# Patient Record
Sex: FEMALE | Race: WHITE | NOT HISPANIC OR LATINO | Employment: FULL TIME | URBAN - METROPOLITAN AREA
[De-identification: names, ages, dates, MRNs, and addresses within clinical notes are randomized per-mention and may not be internally consistent; named-entity substitution may affect disease eponyms.]

---

## 2017-04-15 ENCOUNTER — HOSPITAL ENCOUNTER (EMERGENCY)
Facility: MEDICAL CENTER | Age: 42
End: 2017-04-15
Attending: EMERGENCY MEDICINE

## 2017-04-15 ENCOUNTER — APPOINTMENT (OUTPATIENT)
Dept: RADIOLOGY | Facility: MEDICAL CENTER | Age: 42
End: 2017-04-15
Attending: EMERGENCY MEDICINE

## 2017-04-15 VITALS
DIASTOLIC BLOOD PRESSURE: 68 MMHG | TEMPERATURE: 97.9 F | HEART RATE: 86 BPM | RESPIRATION RATE: 18 BRPM | HEIGHT: 67 IN | WEIGHT: 215 LBS | BODY MASS INDEX: 33.74 KG/M2 | SYSTOLIC BLOOD PRESSURE: 110 MMHG | OXYGEN SATURATION: 96 %

## 2017-04-15 DIAGNOSIS — S93.402A SPRAIN OF LEFT ANKLE, UNSPECIFIED LIGAMENT, INITIAL ENCOUNTER: ICD-10-CM

## 2017-04-15 PROCEDURE — 700102 HCHG RX REV CODE 250 W/ 637 OVERRIDE(OP): Performed by: EMERGENCY MEDICINE

## 2017-04-15 PROCEDURE — 99284 EMERGENCY DEPT VISIT MOD MDM: CPT

## 2017-04-15 PROCEDURE — A9270 NON-COVERED ITEM OR SERVICE: HCPCS | Performed by: EMERGENCY MEDICINE

## 2017-04-15 PROCEDURE — 73620 X-RAY EXAM OF FOOT: CPT | Mod: LT

## 2017-04-15 PROCEDURE — 73610 X-RAY EXAM OF ANKLE: CPT | Mod: LT

## 2017-04-15 RX ORDER — IBUPROFEN 600 MG/1
600 TABLET ORAL EVERY 6 HOURS PRN
COMMUNITY
End: 2018-03-22 | Stop reason: SDUPTHER

## 2017-04-15 RX ORDER — OXYCODONE AND ACETAMINOPHEN 10; 325 MG/1; MG/1
1 TABLET ORAL ONCE
Status: COMPLETED | OUTPATIENT
Start: 2017-04-15 | End: 2017-04-15

## 2017-04-15 RX ORDER — HYDROCODONE BITARTRATE AND ACETAMINOPHEN 5; 325 MG/1; MG/1
1-2 TABLET ORAL EVERY 4 HOURS PRN
Qty: 15 TAB | Refills: 0 | Status: SHIPPED | OUTPATIENT
Start: 2017-04-15 | End: 2018-02-07

## 2017-04-15 RX ADMIN — OXYCODONE HYDROCHLORIDE AND ACETAMINOPHEN 1 TABLET: 10; 325 TABLET ORAL at 17:54

## 2017-04-15 ASSESSMENT — PAIN SCALES - GENERAL: PAINLEVEL_OUTOF10: 1

## 2017-04-15 NOTE — ED AVS SNAPSHOT
4/15/2017    Gerald Stein  No address on file.    Dear Gerald:    [facility] wants to ensure your discharge home is safe and you or your loved ones have had all of your questions answered regarding your care after you leave the hospital.    Below is a list of resources and contact information should you have any questions regarding your hospital stay, follow-up instructions, or active medical symptoms.    Questions or Concerns Regarding… Contact   Medical Questions Related to Your Discharge  (7 days a week, 8am-5pm) Contact a Nurse Care Coordinator   [phone]   Medical Questions Not Related to Your Discharge  (24 hours a day / 7 days a week)  Contact the Nurse Health Line   [phone]    Medications or Discharge Instructions Refer to your discharge packet   or contact your [facility] Primary Care Provider   [phone]   Follow-up Appointment(s) Schedule your appointment via Degreed   or contact Scheduling [phone]   Billing Review your statement via Degreed  or contact Billing [phone]   Medical Records Review your records via Degreed   or contact Medical Records [phone]     You may receive a telephone call within two days of discharge. This call is to make certain you understand your discharge instructions and have the opportunity to have any questions answered. You can also easily access your medical information, test results and upcoming appointments via the Degreed free online health management tool. You can learn more and sign up at 8Trip/Degreed. For assistance setting up your Degreed account, please call [phone].    Once again, we want to ensure your discharge home is safe and that you have a clear understanding of any next steps in your care. If you have any questions or concerns, please do not hesitate to contact us, we are here for you. Thank you for choosing [facility] for your healthcare needs.    Sincerely,    Your [facility] Healthcare Team

## 2017-04-15 NOTE — ED AVS SNAPSHOT
Home Care Instructions                                                                                                                Gerald Stien   MRN: [id number]    Department:  [facility], Emergency Dept   Date of Visit:  4/15/2017            [facility], Emergency Dept    [address]    [place] [address]    Phone:  [phone]      You were seen by     Goldy Walters M.D.      Your Diagnosis Was     Sprain of left ankle, unspecified ligament, initial encounter     S93.402A       These are the medications you received during your hospitalization from 04/15/2017 1550 to 04/15/2017 1909     Date/Time Order Dose Route Action    04/15/2017 1754 oxycodone-acetaminophen (PERCOCET-10)  MG per tablet 1 Tab 1 Tab Oral Given      Medication Information     Review all of your home medications and newly ordered medications with your primary doctor and/or pharmacist as soon as possible. Follow medication instructions as directed by your doctor and/or pharmacist.     Please keep your complete medication list with you and share with your physician. Update the information when medications are discontinued, doses are changed, or new medications (including over-the-counter products) are added; and carry medication information at all times in the event of emergency situations.               Medication List      START taking these medications        Instructions    Morning Afternoon Evening Bedtime    hydrocodone-acetaminophen 5-325 MG Tabs per tablet   Commonly known as:  NORCO        Take 1-2 Tabs by mouth every four hours as needed.   Dose:  1-2 Tab                          ASK your doctor about these medications        Instructions    Morning Afternoon Evening Bedtime    ibuprofen 600 MG Tabs   Commonly known as:  MOTRIN        Take 600 mg by mouth every 6 hours as needed.   Dose:  600 mg                             Where to Get Your Medications      You can get these  medications from any pharmacy     Bring a paper prescription for each of these medications    - hydrocodone-acetaminophen 5-325 MG Tabs per tablet            Procedures and tests performed during your visit     DX-ANKLE 3+ VIEWS LEFT    DX-FOOT-2- LEFT        Discharge Instructions       Ankle Sprain  An ankle sprain is an injury to the strong, fibrous tissues (ligaments) that hold the bones of your ankle joint together.   CAUSES  An ankle sprain is usually caused by a fall or by twisting your ankle. Ankle sprains most commonly occur when you step on the outer edge of your foot, and your ankle turns inward. People who participate in sports are more prone to these types of injuries.   SYMPTOMS   · Pain in your ankle. The pain may be present at rest or only when you are trying to stand or walk.  · Swelling.  · Bruising. Bruising may develop immediately or within 1 to 2 days after your injury.  · Difficulty standing or walking, particularly when turning corners or changing directions.  DIAGNOSIS   Your caregiver will ask you details about your injury and perform a physical exam of your ankle to determine if you have an ankle sprain. During the physical exam, your caregiver will press on and apply pressure to specific areas of your foot and ankle. Your caregiver will try to move your ankle in certain ways. An X-ray exam may be done to be sure a bone was not broken or a ligament did not separate from one of the bones in your ankle (avulsion fracture).   TREATMENT   Certain types of braces can help stabilize your ankle. Your caregiver can make a recommendation for this. Your caregiver may recommend the use of medicine for pain. If your sprain is severe, your caregiver may refer you to a surgeon who helps to restore function to parts of your skeletal system (orthopedist) or a physical therapist.  HOME CARE INSTRUCTIONS   · Apply ice to your injury for 1-2 days or as directed by your caregiver. Applying ice helps to reduce  inflammation and pain.  ¨ Put ice in a plastic bag.  ¨ Place a towel between your skin and the bag.  ¨ Leave the ice on for 15-20 minutes at a time, every 2 hours while you are awake.  · Only take over-the-counter or prescription medicines for pain, discomfort, or fever as directed by your caregiver.  · Elevate your injured ankle above the level of your heart as much as possible for 2-3 days.  · If your caregiver recommends crutches, use them as instructed. Gradually put weight on the affected ankle. Continue to use crutches or a cane until you can walk without feeling pain in your ankle.  · If you have a plaster splint, wear the splint as directed by your caregiver. Do not rest it on anything harder than a pillow for the first 24 hours. Do not put weight on it. Do not get it wet. You may take it off to take a shower or bath.  · You may have been given an elastic bandage to wear around your ankle to provide support. If the elastic bandage is too tight (you have numbness or tingling in your foot or your foot becomes cold and blue), adjust the bandage to make it comfortable.  · If you have an air splint, you may blow more air into it or let air out to make it more comfortable. You may take your splint off at night and before taking a shower or bath. Wiggle your toes in the splint several times per day to decrease swelling.  SEEK MEDICAL CARE IF:   · You have rapidly increasing bruising or swelling.  · Your toes feel extremely cold or you lose feeling in your foot.  · Your pain is not relieved with medicine.  SEEK IMMEDIATE MEDICAL CARE IF:  · Your toes are numb or blue.  · You have severe pain that is increasing.  MAKE SURE YOU:   · Understand these instructions.  · Will watch your condition.  · Will get help right away if you are not doing well or get worse.     This information is not intended to replace advice given to you by your health care provider. Make sure you discuss any questions you have with your health  care provider.     Document Released: 12/18/2006 Document Revised: 01/08/2016 Document Reviewed: 12/29/2012  Elsevier Interactive Patient Education ©2016 Elsevier Inc.            Patient Information     Patient Information    Following emergency treatment: all patient requiring follow-up care must return either to a private physician or a clinic if your condition worsens before you are able to obtain further medical attention, please return to the emergency room.     Billing Information    At [facility], we work to make the billing process streamlined for our patients.  Our Representatives are here to answer any questions you may have regarding your hospital bill.  If you have insurance coverage and have supplied your insurance information to us, we will submit a claim to your insurer on your behalf.  Should you have any questions regarding your bill, we can be reached online or by phone as follows:  Online: You are able pay your bills online or live chat with our representatives about any billing questions you may have. We are here to help Monday - Friday from 8:00am to 7:30pm and 9:00am - 12:00pm on Saturdays.  Please visit [email or web address]  for more information.   Phone:  [phone] or [phone]    Please note that your emergency physician, surgeon, pathologist, radiologist, anesthesiologist, and other specialists are not employed by [facility] and will therefore bill separately for their services.  Please contact them directly for any questions concerning their bills at the numbers below:     Emergency Physician Services:  [phone]  [facility]:  [phone]  [facility]:  [phone]  [facility]:  [phone]    1. Your final bill may vary from the amount quoted upon discharge if all procedures are not complete at that time, or if your doctor has additional procedures of which we are not aware. You will receive  an additional bill if you return to the Emergency Department at [facility] for suture removal regardless of the facility of which the sutures were placed.     2. Please arrange for settlement of this account at the emergency registration.    3. All self-pay accounts are due in full at the time of treatment.  If you are unable to meet this obligation then payment is expected within 4-5 days.     4. If you have had radiology studies (CT, X-ray, Ultrasound, MRI), you have received a preliminary result during your emergency department visit. Please contact the radiology department [phone] to receive a copy of your final result. Please discuss the Final result with your primary physician or with the follow up physician provided.     Crisis Hotline:  [place] Crisis Hotline:  6-700-IXMPYAZ or 1-380.408.3947  [place] Crisis Hotline:    1-162.442.1560 or 378-776-2081         ED Discharge Follow Up Questions    1. In order to provide you with very good care, we would like to follow up with a phone call in the next few days.  May we have your permission to contact you?     YES /  NO    2. What is the best phone number to call you? (       )_____-__________    3. What is the best time to call you?      Morning  /  Afternoon  /  Evening                   Patient Signature:  ____________________________________________________________    Date:  ____________________________________________________________

## 2017-04-15 NOTE — ED NOTES
Pt to triage via w/c c/o left sided top of foot pain after missing a step down the stairs. No obvious deformities however pt will not let me remove her shoe due to too much pain.

## 2017-04-15 NOTE — ED AVS SNAPSHOT
[employer] Access Code: [email or web address]  Expires: 5/15/2017  7:09 PM    Your email address is not on file at [facility].  Email Addresses are required for you to sign up for [employer], please contact [phone] to verify your personal information and to provide your email address prior to attempting to register for [employer].    Gerald Stein  No address on file    [employer]  A secure, online tool to manage your health information     [facility]’s [employer]® is a secure, online tool that connects you to your personalized health information from the privacy of your home -- day or night - making it very easy for you to manage your healthcare. Once the activation process is completed, you can even access your medical information using the [employer] lesvia, which is available for free in the Apple Lesvia store or Google Play store.     To learn more about [employer], visit [email or web address]    There are two levels of access available (as shown below):   My Chart Features  [facility] Primary Care Doctor [facility]  Specialists [facility]  Urgent  Care Non-[facility] Primary Care Doctor   Email your healthcare team securely and privately 24/7 X X X    Manage appointments: schedule your next appointment; view details of past/upcoming appointments X      Request prescription refills. X      View recent personal medical records, including lab and immunizations X X X X   View health record, including health history, allergies, medications X X X X   Read reports about your outpatient visits, procedures, consult and ER notes X X X X   See your discharge summary, which is a recap of your hospital and/or ER visit that includes your diagnosis, lab results, and care plan X X  X     How to register for [employer]:  Once your e-mail address has been verified, follow the following steps to sign up for [employer].     1. Go to  [email or web address]  2. Click on the Sign Up Now box, which takes you to the New Member Sign Up page. You will need to provide  the following information:  a. Enter your Duriana Access Code exactly as it appears at the top of this page. (You will not need to use this code after you’ve completed the sign-up process. If you do not sign up before the expiration date, you must request a new code.)   b. Enter your date of birth.   c. Enter your home email address.   d. Click Submit, and follow the next screen’s instructions.  3. Create a Duriana ID. This will be your Duriana login ID and cannot be changed, so think of one that is secure and easy to remember.  4. Create a Duriana password. You can change your password at any time.  5. Enter your Password Reset Question and Answer. This can be used at a later time if you forget your password.   6. Enter your e-mail address. This allows you to receive e-mail notifications when new information is available in Duriana.  7. Click Sign Up. You can now view your health information.    For assistance activating your [employer] account, call [phone]

## 2017-04-15 NOTE — LETTER
[facility], EMERGENCY DEPT  [address] [place] [address]  [phone]     April 15, 2017    Patient: Gerald Stein   YOB: 1975   Date of Visit: 4/15/2017       To Whom It May Concern:    Gerald Stein was seen and treated in our department on 4/15/2017.     May excuse from work on 4/15/2017 until 4/20/2017      Sincerely,       Emergency Department  Anisha Sibley R.N.

## 2017-04-16 NOTE — ED PROVIDER NOTES
"ED Provider Note    Scribed for Goldy Walters M.D. by Aden Canseco. 4/15/2017  5:48 PM    Primary care provider: No primary care provider on file.  Means of arrival: Walk in  History obtained from: Patient  History limited by: None    CHIEF COMPLAINT  Chief Complaint   Patient presents with   • Foot Pain       HPI  Gerald Stein is a 41 y.o. female who presents to the Emergency Department complaining of left foot pain onset 1.5 hours ago. Patient states she was carrying laundry down the stairs when she tripped and injured her foot. She notes the pain radiates to her ankle. She also notes associated mild pain up her leg. Patient states pain is exacerbated when putting weight on the leg. She does not report any fevers.    REVIEW OF SYSTEMS  Pertinent positives include left foot, leg, and ankle pain. Pertinent negatives include no fevers.    See HPI for further details.   E    PAST MEDICAL HISTORY   None noted    SURGICAL HISTORY  patient denies any surgical history    SOCIAL HISTORY  None noted    FAMILY HISTORY  None noted    CURRENT MEDICATIONS  Home Medications     Reviewed by Anisha Sibley R.N. (Registered Nurse) on 04/15/17 at 1223  Med List Status: Complete    Medication Last Dose Status    ibuprofen (MOTRIN) 600 MG Tab prn Active                ALLERGIES  Allergies   Allergen Reactions   • Flagyl [Kdc:Metronidazole+Tartrazine] Rash     Rash         PHYSICAL EXAM  VITAL SIGNS: /78 mmHg  Pulse 105  Temp(Src) 36.6 °C (97.9 °F) (Temporal)  Resp 19  Ht 1.702 m (5' 7\")  Wt 97.523 kg (215 lb)  BMI 33.67 kg/m2  SpO2 98%    Constitutional: Well developed, Well nourished,no acute distress, Non-toxic appearance.   HENT: Normocephalic, Atraumatic.  Oropharynx moist.   Eyes: PERRL, EOMI, Conjunctiva normal, No discharge.   Neck: no anterior cervical lymphadenopathy  CV: Good pulses  Thorax & Lungs: No respiratory distress.   Abdomen:  Soft, non-tender.  No rebound, no peritoneal signs.   Skin: " Warm, Dry, No erythema, No rash.    Musculoskeletal: No major deformities noted. Swelling and tenderness medially and laterally to left ankle and left foot. No proximal tenderness.  Neurologic: Awake, alert. Moves all extremities spontaneously.  Psychiatric: Affect normal, Mood normal.     RADIOLOGY  DX-FOOT-2- LEFT   Final Result      No evidence of fracture.      DX-ANKLE 3+ VIEWS LEFT   Final Result      No evidence of fracture.        The radiologist's interpretation of all radiological studies have been reviewed by me.    COURSE & MEDICAL DECISION MAKING  Nursing notes, VS, PMSFHx reviewed in chart.    5:48 PM - Patient seen and examined at bedside. Patient will be treated with Percocet-10  mg. Ordered DX ankle, DX foot to evaluate her symptoms. We'll put    7:01 PM Patient reevaluated at bedside. Discussed radiology results as seen above which show no evidence of fracture. The patient will be discharged and should return if symptoms worsen or if new symptoms arise. The patient understands and agrees to plan. We'll put on crutches for the next few days    I reviewed prescription monitoring program for patient's narcotic use before prescribing a scheduled drug.The patient will not drink alcohol nor drive with prescribed medications. The patient will return for new or worsening symptoms and is stable at the time of discharge.    The patient is referred to a primary physician for blood pressure management, diabetic screening, and for all other preventative health concerns.    DISPOSITION:  Patient will be discharged home in stable condition.    FOLLOW UP:dr Wong      OUTPATIENT MEDICATIONS:  Discharge Medication List as of 4/15/2017  7:09 PM      START taking these medications    Details   hydrocodone-acetaminophen (NORCO) 5-325 MG Tab per tablet Take 1-2 Tabs by mouth every four hours as needed., Disp-15 Tab, R-0, Print Rx Paper               FINAL IMPRESSION  1. Sprain of left ankle, unspecified  ligament, initial encounter          I, Aden Canseco (Scribe), am scribing for, and in the presence of, Goldy Walters M.D..    Electronically signed by: Aden Canseco (Scribe), 4/15/2017    IGoldy M.D. personally performed the services described in this documentation, as scribed by Aden Canseco in my presence, and it is both accurate and complete.    The note accurately reflects work and decisions made by me.  Goldy Walters  4/15/2017  7:58 PM

## 2017-04-16 NOTE — ED NOTES
poc explained to pt. Medicated per mar order. Allergies verified.  Pt denies pregnancy. Waiting for xray.

## 2017-04-16 NOTE — ED NOTES
Discharge instructions given to pt including follow up w/pcp or returning for any worsening symptoms.  Per erp pt may have several days off. Work note provided to pt. Pt verbalized relief of pain.  Prescription for pain given to pt and instructed no driving no drinking alcohol while on prescribed pain medication.  Crutches including provided to pt by ED tech.

## 2017-04-16 NOTE — DISCHARGE INSTRUCTIONS
Ankle Sprain  An ankle sprain is an injury to the strong, fibrous tissues (ligaments) that hold the bones of your ankle joint together.   CAUSES  An ankle sprain is usually caused by a fall or by twisting your ankle. Ankle sprains most commonly occur when you step on the outer edge of your foot, and your ankle turns inward. People who participate in sports are more prone to these types of injuries.   SYMPTOMS   · Pain in your ankle. The pain may be present at rest or only when you are trying to stand or walk.  · Swelling.  · Bruising. Bruising may develop immediately or within 1 to 2 days after your injury.  · Difficulty standing or walking, particularly when turning corners or changing directions.  DIAGNOSIS   Your caregiver will ask you details about your injury and perform a physical exam of your ankle to determine if you have an ankle sprain. During the physical exam, your caregiver will press on and apply pressure to specific areas of your foot and ankle. Your caregiver will try to move your ankle in certain ways. An X-ray exam may be done to be sure a bone was not broken or a ligament did not separate from one of the bones in your ankle (avulsion fracture).   TREATMENT   Certain types of braces can help stabilize your ankle. Your caregiver can make a recommendation for this. Your caregiver may recommend the use of medicine for pain. If your sprain is severe, your caregiver may refer you to a surgeon who helps to restore function to parts of your skeletal system (orthopedist) or a physical therapist.  HOME CARE INSTRUCTIONS   · Apply ice to your injury for 1-2 days or as directed by your caregiver. Applying ice helps to reduce inflammation and pain.  ¨ Put ice in a plastic bag.  ¨ Place a towel between your skin and the bag.  ¨ Leave the ice on for 15-20 minutes at a time, every 2 hours while you are awake.  · Only take over-the-counter or prescription medicines for pain, discomfort, or fever as directed by  your caregiver.  · Elevate your injured ankle above the level of your heart as much as possible for 2-3 days.  · If your caregiver recommends crutches, use them as instructed. Gradually put weight on the affected ankle. Continue to use crutches or a cane until you can walk without feeling pain in your ankle.  · If you have a plaster splint, wear the splint as directed by your caregiver. Do not rest it on anything harder than a pillow for the first 24 hours. Do not put weight on it. Do not get it wet. You may take it off to take a shower or bath.  · You may have been given an elastic bandage to wear around your ankle to provide support. If the elastic bandage is too tight (you have numbness or tingling in your foot or your foot becomes cold and blue), adjust the bandage to make it comfortable.  · If you have an air splint, you may blow more air into it or let air out to make it more comfortable. You may take your splint off at night and before taking a shower or bath. Wiggle your toes in the splint several times per day to decrease swelling.  SEEK MEDICAL CARE IF:   · You have rapidly increasing bruising or swelling.  · Your toes feel extremely cold or you lose feeling in your foot.  · Your pain is not relieved with medicine.  SEEK IMMEDIATE MEDICAL CARE IF:  · Your toes are numb or blue.  · You have severe pain that is increasing.  MAKE SURE YOU:   · Understand these instructions.  · Will watch your condition.  · Will get help right away if you are not doing well or get worse.     This information is not intended to replace advice given to you by your health care provider. Make sure you discuss any questions you have with your health care provider.     Document Released: 12/18/2006 Document Revised: 01/08/2016 Document Reviewed: 12/29/2012  [employer] Interactive Patient Education ©2016 [employer]

## 2018-02-07 ENCOUNTER — OFFICE VISIT (OUTPATIENT)
Dept: MEDICAL GROUP | Facility: MEDICAL CENTER | Age: 43
End: 2018-02-07
Attending: INTERNAL MEDICINE
Payer: COMMERCIAL

## 2018-02-07 VITALS
BODY MASS INDEX: 34.06 KG/M2 | WEIGHT: 217 LBS | OXYGEN SATURATION: 99 % | SYSTOLIC BLOOD PRESSURE: 124 MMHG | DIASTOLIC BLOOD PRESSURE: 84 MMHG | RESPIRATION RATE: 16 BRPM | HEIGHT: 67 IN | TEMPERATURE: 97.5 F | HEART RATE: 88 BPM

## 2018-02-07 DIAGNOSIS — E66.9 OBESITY (BMI 30-39.9): ICD-10-CM

## 2018-02-07 DIAGNOSIS — Z13.220 SCREENING, LIPID: ICD-10-CM

## 2018-02-07 DIAGNOSIS — R53.83 FATIGUE, UNSPECIFIED TYPE: ICD-10-CM

## 2018-02-07 DIAGNOSIS — R51.9 CHRONIC NONINTRACTABLE HEADACHE, UNSPECIFIED HEADACHE TYPE: ICD-10-CM

## 2018-02-07 DIAGNOSIS — Z13.1 SCREENING FOR DIABETES MELLITUS: ICD-10-CM

## 2018-02-07 DIAGNOSIS — G89.29 CHRONIC NONINTRACTABLE HEADACHE, UNSPECIFIED HEADACHE TYPE: ICD-10-CM

## 2018-02-07 PROBLEM — R14.0 BLOATING: Status: ACTIVE | Noted: 2018-02-07

## 2018-02-07 PROCEDURE — 99204 OFFICE O/P NEW MOD 45 MIN: CPT | Performed by: INTERNAL MEDICINE

## 2018-02-07 ASSESSMENT — PAIN SCALES - GENERAL: PAINLEVEL: 3=SLIGHT PAIN

## 2018-02-07 ASSESSMENT — PATIENT HEALTH QUESTIONNAIRE - PHQ9: CLINICAL INTERPRETATION OF PHQ2 SCORE: 0

## 2018-02-08 NOTE — PROGRESS NOTES
Gerald Stein is a 42 y.o. female here for worsening headaches and tiredness, establish care  HPI:  No previous PCP  Headache  Patient reports a long history of headaches, however since moving to [place] 3 years ago, they have gotten worse. She feels over the past several months, they have become nearly unbearable. She states that every week she will have about 3 days where she has a headache. It is usually over the right temporal area and the left parietal region. She has not found any alleviating factors although she has tried Tylenol and ibuprofen. Sometimes the headaches are present in the mornings and worse at that time. She denies snoring, orthopnea, PND. She denies associated nausea and vomiting but does complain of photophobia with the headaches. She also feels like her vision gets blurry when she has a headache but otherwise sees normally. She complains of associated neck pain that has been slightly worse lately but not problematic enough to prompt her to take anything for it. She also complains of significant increase in her stress level. She has a history of grinding her teeth and requiring a  in the past, which she has not been wearing recently. She does feel like she is grinding more severely at night. She has a positive family history for brain cancer in her father when he was around 65 years old.    Fatigue  Patient reports worsening fatigue. She states that she does have some difficulty sleeping at night and uses melatonin to help with this. She denies snoring, orthopnea, PND. Has been about 3 years since she's had any blood work.    Current medicines (including changes today)  Current Outpatient Prescriptions   Medication Sig Dispense Refill   • ibuprofen (MOTRIN) 600 MG Tab Take 600 mg by mouth every 6 hours as needed.       No current facility-administered medications for this visit.      She  has a past medical history of Head ache and Obesity.  She  has a past surgical history that  "includes tubal coagulation laparoscopic bilateral and tube & ectopic preg., removal.  Social History   Substance Use Topics   • Smoking status: Former Smoker     Packs/day: 1.00     Years: 10.00     Types: Cigarettes     Quit date: 2/7/2000   • Smokeless tobacco: Never Used   • Alcohol use 3.6 oz/week     6 Cans of beer per week     Social History     Social History Narrative   • No narrative on file     Family History   Problem Relation Age of Onset   • Heart Disease Father    • Cancer Father 65     brain   • Cancer Sister      eye lid, skin   • Diabetes Neg Hx    • Stroke Neg Hx          ROS  As above in HPI  All other systems reviewed and are negative  Complains of bloating that is chronic     Objective:     Blood pressure 124/84, pulse 88, temperature 36.4 °C (97.5 °F), resp. rate 16, height 1.702 m (5' 7.01\"), weight 98.4 kg (217 lb), SpO2 99 %. Body mass index is 33.98 kg/m².  Physical Exam:    Constitutional: Alert, no distress.  Skin: Warm, dry, good turgor, no rashes in visible areas.  Eye: Equal, round and reactive, conjunctiva clear, lids normal.  ENMT: Lips without lesions, fair dentition, oropharynx clear, TM's clear bilaterally but scarred, no tenderness to palpation over frontal or maxillary sinuses bilaterally, turbinates slightly enlarged bilaterally.  Neck: Trachea midline, no masses, no thyromegaly. No cervical or supraclavicular lymphadenopathy.  Respiratory: Unlabored respiratory effort, lungs clear to auscultation, no wheezes, no ronchi.  Cardiovascular: Normal S1, S2, no murmur, no edema.  Abdomen: Soft, non-tender, no masses, no hepatosplenomegaly.  Psych: Alert and oriented x3, normal affect and mood.  Neuro: CN II-XII grossly intact      Assessment and Plan:   The following treatment plan was discussed    1. Chronic nonintractable headache, unspecified headache type  Unclear etiology at this point. Does not sound classic for migraines as the headaches are bilateral, with no nausea, and " they're not debilitating. She may have tension headaches with the increased neck discomfort she's been describing. Differential would also include space-occupying lesion with her family history and the worsening headaches over the past 3 months, so we will obtain a CT head to rule this out. She feels that her vision is not normal especially when she has a headache and she is going to have vision testing through an optometrist to rule out this component. Chronic sinusitis is a possibility although less likely based on my exam. TMJ related headache could also be going on. Stress and anxiety may be worsening her headaches as well. She may also have sleep apnea although she doesn't have typical symptoms for this.  - CT-HEAD W/O; Future  -Recommended patient start wearing a   -Follow-up with optometry for vision testing  -Follow-up for imaging review in 4 weeks    2. Fatigue, unspecified type  We will obtain some basic blood work as initial workup. Patient will follow-up for review in 4 weeks  - TSH WITH REFLEX TO FT4; Future  - CBC WITH DIFFERENTIAL; Future  - COMP METABOLIC PANEL; Future    3. Need for vaccination  - Tdap =>6yo IM    4. Screening, lipid  - LIPID PROFILE; Future    5. Screening for diabetes mellitus  - HEMOGLOBIN A1C; Future    6. Obesity (BMI 30-39.9)  - Patient identified as having weight management issue.  Appropriate orders and counseling given.    HCM  With regards to her health maintenance, patient is due for a Pap smear and a mammogram. We will discuss these at her next visit.    Followup: Return in about 4 weeks (around 3/7/2018) for labs, imaging, headaches, bloating.

## 2018-02-08 NOTE — ASSESSMENT & PLAN NOTE
Patient reports worsening fatigue. She states that she does have some difficulty sleeping at night and uses melatonin to help with this. She denies snoring, orthopnea, PND. Has been about 3 years since she's had any blood work.

## 2018-02-08 NOTE — ASSESSMENT & PLAN NOTE
Patient reports a long history of headaches, however since moving to [place] 3 years ago, they have gotten worse. She feels over the past several months, they have become nearly unbearable. She states that every week she will have about 3 days where she has a headache. It is usually over the right temporal area and the left parietal region. She has not found any alleviating factors although she has tried Tylenol and ibuprofen. Sometimes the headaches are present in the mornings and worse at that time. She denies snoring, orthopnea, PND. She denies associated nausea and vomiting but does complain of photophobia with the headaches. She also feels like her vision gets blurry when she has a headache but otherwise sees normally. She complains of associated neck pain that has been slightly worse lately but not problematic enough to prompt her to take anything for it. She also complains of significant increase in her stress level. She has a history of grinding her teeth and requiring a  in the past, which she has not been wearing recently. She does feel like she is grinding more severely at night. She has a positive family history for brain cancer in her father when he was around 65 years old.

## 2018-02-15 ENCOUNTER — HOSPITAL ENCOUNTER (OUTPATIENT)
Dept: LAB | Facility: MEDICAL CENTER | Age: 43
End: 2018-02-15
Attending: INTERNAL MEDICINE
Payer: COMMERCIAL

## 2018-02-15 DIAGNOSIS — Z13.220 SCREENING, LIPID: ICD-10-CM

## 2018-02-15 DIAGNOSIS — R53.83 FATIGUE, UNSPECIFIED TYPE: ICD-10-CM

## 2018-02-15 DIAGNOSIS — Z13.1 SCREENING FOR DIABETES MELLITUS: ICD-10-CM

## 2018-02-15 LAB
ALBUMIN SERPL BCP-MCNC: 4 G/DL (ref 3.2–4.9)
ALBUMIN/GLOB SERPL: 1.3 G/DL
ALP SERPL-CCNC: 57 U/L (ref 30–99)
ALT SERPL-CCNC: 18 U/L (ref 2–50)
ANION GAP SERPL CALC-SCNC: 5 MMOL/L (ref 0–11.9)
AST SERPL-CCNC: 20 U/L (ref 12–45)
BASOPHILS # BLD AUTO: 1.3 % (ref 0–1.8)
BASOPHILS # BLD: 0.11 K/UL (ref 0–0.12)
BILIRUB SERPL-MCNC: 1.7 MG/DL (ref 0.1–1.5)
BUN SERPL-MCNC: 13 MG/DL (ref 8–22)
CALCIUM SERPL-MCNC: 8.9 MG/DL (ref 8.5–10.5)
CHLORIDE SERPL-SCNC: 105 MMOL/L (ref 96–112)
CHOLEST SERPL-MCNC: 156 MG/DL (ref 100–199)
CO2 SERPL-SCNC: 25 MMOL/L (ref 20–33)
CREAT SERPL-MCNC: 0.93 MG/DL (ref 0.5–1.4)
EOSINOPHIL # BLD AUTO: 0.29 K/UL (ref 0–0.51)
EOSINOPHIL NFR BLD: 3.5 % (ref 0–6.9)
ERYTHROCYTE [DISTWIDTH] IN BLOOD BY AUTOMATED COUNT: 42.1 FL (ref 35.9–50)
EST. AVERAGE GLUCOSE BLD GHB EST-MCNC: 97 MG/DL
GLOBULIN SER CALC-MCNC: 3.1 G/DL (ref 1.9–3.5)
GLUCOSE SERPL-MCNC: 86 MG/DL (ref 65–99)
HBA1C MFR BLD: 5 % (ref 0–5.6)
HCT VFR BLD AUTO: 43.8 % (ref 37–47)
HDLC SERPL-MCNC: 31 MG/DL
HGB BLD-MCNC: 14.6 G/DL (ref 12–16)
IMM GRANULOCYTES # BLD AUTO: 0.03 K/UL (ref 0–0.11)
IMM GRANULOCYTES NFR BLD AUTO: 0.4 % (ref 0–0.9)
LDLC SERPL CALC-MCNC: 82 MG/DL
LYMPHOCYTES # BLD AUTO: 2.36 K/UL (ref 1–4.8)
LYMPHOCYTES NFR BLD: 28.1 % (ref 22–41)
MCH RBC QN AUTO: 31.1 PG (ref 27–33)
MCHC RBC AUTO-ENTMCNC: 33.3 G/DL (ref 33.6–35)
MCV RBC AUTO: 93.4 FL (ref 81.4–97.8)
MONOCYTES # BLD AUTO: 0.6 K/UL (ref 0–0.85)
MONOCYTES NFR BLD AUTO: 7.2 % (ref 0–13.4)
NEUTROPHILS # BLD AUTO: 5 K/UL (ref 2–7.15)
NEUTROPHILS NFR BLD: 59.5 % (ref 44–72)
NRBC # BLD AUTO: 0 K/UL
NRBC BLD-RTO: 0 /100 WBC
PLATELET # BLD AUTO: 190 K/UL (ref 164–446)
PMV BLD AUTO: 11 FL (ref 9–12.9)
POTASSIUM SERPL-SCNC: 3.7 MMOL/L (ref 3.6–5.5)
PROT SERPL-MCNC: 7.1 G/DL (ref 6–8.2)
RBC # BLD AUTO: 4.69 M/UL (ref 4.2–5.4)
SODIUM SERPL-SCNC: 135 MMOL/L (ref 135–145)
TRIGL SERPL-MCNC: 215 MG/DL (ref 0–149)
TSH SERPL DL<=0.005 MIU/L-ACNC: 1.04 UIU/ML (ref 0.38–5.33)
WBC # BLD AUTO: 8.4 K/UL (ref 4.8–10.8)

## 2018-02-15 PROCEDURE — 36415 COLL VENOUS BLD VENIPUNCTURE: CPT

## 2018-02-15 PROCEDURE — 84443 ASSAY THYROID STIM HORMONE: CPT

## 2018-02-15 PROCEDURE — 83036 HEMOGLOBIN GLYCOSYLATED A1C: CPT

## 2018-02-15 PROCEDURE — 80053 COMPREHEN METABOLIC PANEL: CPT

## 2018-02-15 PROCEDURE — 85025 COMPLETE CBC W/AUTO DIFF WBC: CPT

## 2018-02-15 PROCEDURE — 80061 LIPID PANEL: CPT

## 2018-02-20 ENCOUNTER — HOSPITAL ENCOUNTER (OUTPATIENT)
Facility: MEDICAL CENTER | Age: 43
End: 2018-02-20
Attending: INTERNAL MEDICINE
Payer: COMMERCIAL

## 2018-02-20 ENCOUNTER — OFFICE VISIT (OUTPATIENT)
Dept: MEDICAL GROUP | Facility: MEDICAL CENTER | Age: 43
End: 2018-02-20
Attending: INTERNAL MEDICINE
Payer: COMMERCIAL

## 2018-02-20 VITALS
RESPIRATION RATE: 16 BRPM | OXYGEN SATURATION: 100 % | TEMPERATURE: 98.1 F | SYSTOLIC BLOOD PRESSURE: 110 MMHG | DIASTOLIC BLOOD PRESSURE: 78 MMHG | HEART RATE: 96 BPM | WEIGHT: 216 LBS | BODY MASS INDEX: 33.9 KG/M2 | HEIGHT: 67 IN

## 2018-02-20 DIAGNOSIS — Z12.4 SCREENING FOR MALIGNANT NEOPLASM OF CERVIX: ICD-10-CM

## 2018-02-20 DIAGNOSIS — N89.8 VAGINAL ITCHING: ICD-10-CM

## 2018-02-20 PROCEDURE — 87660 TRICHOMONAS VAGIN DIR PROBE: CPT

## 2018-02-20 PROCEDURE — 87480 CANDIDA DNA DIR PROBE: CPT

## 2018-02-20 PROCEDURE — 87624 HPV HI-RISK TYP POOLED RSLT: CPT

## 2018-02-20 PROCEDURE — 88175 CYTOPATH C/V AUTO FLUID REDO: CPT

## 2018-02-20 PROCEDURE — 99213 OFFICE O/P EST LOW 20 MIN: CPT | Performed by: INTERNAL MEDICINE

## 2018-02-20 PROCEDURE — 87510 GARDNER VAG DNA DIR PROBE: CPT

## 2018-02-20 PROCEDURE — 87591 N.GONORRHOEAE DNA AMP PROB: CPT

## 2018-02-20 PROCEDURE — 99214 OFFICE O/P EST MOD 30 MIN: CPT | Mod: 25 | Performed by: INTERNAL MEDICINE

## 2018-02-20 PROCEDURE — 87491 CHLMYD TRACH DNA AMP PROBE: CPT

## 2018-02-20 ASSESSMENT — PAIN SCALES - GENERAL: PAINLEVEL: 5=MODERATE PAIN

## 2018-02-21 DIAGNOSIS — N76.0 BACTERIAL VAGINOSIS: ICD-10-CM

## 2018-02-21 DIAGNOSIS — B96.89 BACTERIAL VAGINOSIS: ICD-10-CM

## 2018-02-21 LAB
CANDIDA DNA VAG QL PROBE+SIG AMP: NEGATIVE
G VAGINALIS DNA VAG QL PROBE+SIG AMP: POSITIVE
T VAGINALIS DNA VAG QL PROBE+SIG AMP: NEGATIVE

## 2018-02-21 RX ORDER — CLINDAMYCIN HYDROCHLORIDE 300 MG/1
300 CAPSULE ORAL 2 TIMES DAILY
Qty: 14 CAP | Refills: 0 | Status: SHIPPED | OUTPATIENT
Start: 2018-02-21 | End: 2018-02-28

## 2018-02-21 NOTE — ASSESSMENT & PLAN NOTE
Patient reports that for about 2 weeks she has had increased vaginal irritation and itching. She states that she always has a significant amount of vaginal discharge but it hasn't been more than normal. She has been applying anti-itch cream. She states she usually uses over-the-counter metronidazole vaginal preparation to treat this when it has happened in the past but she generally has to do at least 6 days worth. She feels like she has been having more frequent episodes of this lately. She is sexually active with one male partner.

## 2018-02-21 NOTE — PROGRESS NOTES
"Subjective:   Gerald Stein is a 42 y.o. female here today for vaginal itching, PAP    Vaginal itching  Patient reports that for about 2 weeks she has had increased vaginal irritation and itching. She states that she always has a significant amount of vaginal discharge but it hasn't been more than normal. She has been applying anti-itch cream. She states she usually uses over-the-counter metronidazole vaginal preparation to treat this when it has happened in the past but she generally has to do at least 6 days worth. She feels like she has been having more frequent episodes of this lately. She is sexually active with one male partner.    Screening for malignant neoplasm of cervix  Patient reports her last Pap smear was over 3 years ago. She always has consistent vaginal discharge which is discussed above. She denies abnormal vaginal bleeding. She denies dyspareunia or dysuria.       Current medicines (including changes today)  Current Outpatient Prescriptions   Medication Sig Dispense Refill   • ibuprofen (MOTRIN) 600 MG Tab Take 600 mg by mouth every 6 hours as needed.       No current facility-administered medications for this visit.      She  has a past medical history of Head ache and Obesity.    ROS   As above in HPI     Objective:     Blood pressure 110/78, pulse 96, temperature 36.7 °C (98.1 °F), resp. rate 16, height 1.702 m (5' 7.01\"), weight 98 kg (216 lb), SpO2 100 %, not currently breastfeeding. Body mass index is 33.82 kg/m².   Physical Exam:  Constitutional: Alert, no distress.  Skin: Warm, dry, good turgor, no rashes in visible areas.  Eye: Equal, round and reactive, conjunctiva clear, lids normal.  : labia majora somewhat erythematous, cervix with moderate amount of white discharge and somewhat friable with small bleeding after PAP collected, no cervical motion tenderness      Assessment and Plan:   The following treatment plan was discussed    1. Vaginal itching  - VAGINAL PATHOGENS DNA PANEL; " Future    2. Screening for malignant neoplasm of cervix  - THINPREP PAP W/HPV AND CTNG; Future        Followup: Return if symptoms worsen or fail to improve.  Further tx pending results

## 2018-02-21 NOTE — ASSESSMENT & PLAN NOTE
Patient reports her last Pap smear was over 3 years ago. She always has consistent vaginal discharge which is discussed above. She denies abnormal vaginal bleeding. She denies dyspareunia or dysuria.

## 2018-02-22 LAB
C TRACH DNA GENITAL QL NAA+PROBE: NEGATIVE
CYTOLOGY REG CYTOL: NORMAL
HPV HR 12 DNA CVX QL NAA+PROBE: NEGATIVE
HPV16 DNA SPEC QL NAA+PROBE: NEGATIVE
HPV18 DNA SPEC QL NAA+PROBE: NEGATIVE
N GONORRHOEA DNA GENITAL QL NAA+PROBE: NEGATIVE
SPECIMEN SOURCE: NORMAL
SPECIMEN SOURCE: NORMAL

## 2018-02-23 ENCOUNTER — TELEPHONE (OUTPATIENT)
Dept: MEDICAL GROUP | Facility: MEDICAL CENTER | Age: 43
End: 2018-02-23

## 2018-02-23 ENCOUNTER — DOCUMENTATION (OUTPATIENT)
Dept: MEDICAL GROUP | Facility: MEDICAL CENTER | Age: 43
End: 2018-02-23

## 2018-02-23 NOTE — PROGRESS NOTES
Received denial from insurance for CT head. Called for peer to peer.  At this point, the scan has been approved and updated to an MRI which will provide more detail and is overall a better study. Patient will be notified of this change.    Elise Garcia M.D.

## 2018-02-23 NOTE — TELEPHONE ENCOUNTER
----- Message from Elise Garcia M.D. sent at 2/23/2018  7:35 AM PST -----  Please mail normal PAP letter, repeat 3 years.    Elise Garcia M.D.

## 2018-03-08 ENCOUNTER — OFFICE VISIT (OUTPATIENT)
Dept: MEDICAL GROUP | Facility: MEDICAL CENTER | Age: 43
End: 2018-03-08
Attending: INTERNAL MEDICINE
Payer: COMMERCIAL

## 2018-03-08 VITALS
TEMPERATURE: 98 F | SYSTOLIC BLOOD PRESSURE: 118 MMHG | HEIGHT: 67 IN | RESPIRATION RATE: 14 BRPM | WEIGHT: 219 LBS | DIASTOLIC BLOOD PRESSURE: 70 MMHG | OXYGEN SATURATION: 98 % | HEART RATE: 80 BPM | BODY MASS INDEX: 34.37 KG/M2

## 2018-03-08 DIAGNOSIS — R19.7 DIARRHEA, UNSPECIFIED TYPE: ICD-10-CM

## 2018-03-08 DIAGNOSIS — K51.911 ULCERATIVE COLITIS WITH RECTAL BLEEDING, UNSPECIFIED LOCATION (HCC): ICD-10-CM

## 2018-03-08 DIAGNOSIS — R17 ELEVATED BILIRUBIN: ICD-10-CM

## 2018-03-08 PROCEDURE — 99213 OFFICE O/P EST LOW 20 MIN: CPT | Performed by: INTERNAL MEDICINE

## 2018-03-08 PROCEDURE — 99214 OFFICE O/P EST MOD 30 MIN: CPT | Performed by: FAMILY MEDICINE

## 2018-03-08 ASSESSMENT — ENCOUNTER SYMPTOMS
MYALGIAS: 0
DIARRHEA: 1
HEADACHES: 0
ANOREXIA: 0
BLOOD IN STOOL: 1
PALPITATIONS: 0
NAUSEA: 0
BELCHING: 0
COUGH: 0
VOMITING: 0
FEVER: 0
HEMATOCHEZIA: 1
ARTHRALGIAS: 0
CONSTIPATION: 0
ABDOMINAL PAIN: 1
SHORTNESS OF BREATH: 0
SPUTUM PRODUCTION: 0
CHILLS: 0
FLATUS: 0

## 2018-03-08 NOTE — PROGRESS NOTES
Subjective:      Gerald Stein is a 42 y.o. female who presents with No chief complaint on file.            Results for GERALD STEIN (MRN [id number]) as of 3/8/2018 15:29    2/15/2018 08:11  Sodium: 135  Potassium: 3.7  Chloride: 105  Co2: 25  Anion Gap: 5.0  Glucose: 86  Bun: 13  Creatinine: 0.93  GFR If : >60  GFR If Non : >60  Calcium: 8.9  AST(SGOT): 20  ALT(SGPT): 18  Alkaline Phosphatase: 57  Total Bilirubin: 1.7 (H)  Albumin: 4.0  Total Protein: 7.1  Globulin: 3.1  A-G Ratio: 1.3  Glycohemoglobin: 5.0  Estim. Avg Glu: 97        LLQ Pain   This is a recurrent problem. The current episode started more than 1 year ago. The onset quality is undetermined. The problem occurs intermittently. The problem has been gradually worsening. The pain is located in the LLQ, RLQ and periumbilical region. The quality of the pain is colicky, cramping and a sensation of fullness. The abdominal pain does not radiate. Associated symptoms include diarrhea and hematochezia. Pertinent negatives include no anorexia, arthralgias, belching, constipation, dysuria, fever, flatus, frequency, headaches, melena, myalgias, nausea or vomiting. Associated symptoms comments: Bloody stool, fecal incontinence. The pain is aggravated by eating and palpation. The pain is relieved by nothing. She has tried nothing (will order stool studies and refer to GI (history of UC per pt)) for the symptoms.       Review of Systems   Constitutional: Negative for chills and fever.   HENT: Negative for hearing loss and tinnitus.    Respiratory: Negative for cough, sputum production and shortness of breath.    Cardiovascular: Negative for chest pain and palpitations.   Gastrointestinal: Positive for abdominal pain, blood in stool, diarrhea and hematochezia. Negative for anorexia, constipation, flatus, melena, nausea and vomiting.   Genitourinary: Negative for dysuria and frequency.   Musculoskeletal: Negative for arthralgias and  "myalgias.   Neurological: Negative for headaches.          Objective:     Vitals:    03/08/18 1507   BP: 118/70   Pulse: 80   Resp: 14   Temp: 36.7 °C (98 °F)   SpO2: 98%   Weight: 99.3 kg (219 lb)   Height: 1.702 m (5' 7\")          Physical Exam   Constitutional: She is oriented to person, place, and time. She appears well-developed and well-nourished.   HENT:   Head: Normocephalic and atraumatic.   Right Ear: External ear normal.   Left Ear: External ear normal.   Cardiovascular: Normal rate, regular rhythm and normal heart sounds.  Exam reveals no friction rub.    No murmur heard.  Pulmonary/Chest: Effort normal and breath sounds normal. No respiratory distress. She has no wheezes. She has no rales.   Abdominal: Soft. She exhibits distension. She exhibits no mass. There is tenderness. There is no guarding.   Hyperactive bowel sounds    Neurological: She is alert and oriented to person, place, and time.   Skin: Skin is warm and dry.   Nursing note and vitals reviewed.              Assessment/Plan:     1. Ulcerative colitis with rectal bleeding, unspecified location (CMS-HCC)  Will have her referred to GI to establish for a further evaluation and management of her UC. Will continue to follow.  - REFERRAL TO GASTROENTEROLOGY  - CULTURE STOOL; Future  - C Diff by PCR rflx Toxin; Future    2. Diarrhea, unspecified type  Will order a stool cx and C diff for a further evaluation for infectious sources of her her diarrhea.  - REFERRAL TO GASTROENTEROLOGY  - CULTURE STOOL; Future  - C Diff by PCR rflx Toxin; Future    3. Elevated bilirubin  Reviewed the results of her recent blood work, her bilirubin is slightly elevated.   - REFERRAL TO GASTROENTEROLOGY  - CULTURE STOOL; Future  - C Diff by PCR rflx Toxin; Future        "

## 2018-03-10 ENCOUNTER — HOSPITAL ENCOUNTER (OUTPATIENT)
Facility: MEDICAL CENTER | Age: 43
End: 2018-03-10
Attending: FAMILY MEDICINE
Payer: COMMERCIAL

## 2018-03-10 DIAGNOSIS — R19.7 DIARRHEA, UNSPECIFIED TYPE: ICD-10-CM

## 2018-03-10 DIAGNOSIS — K51.911 ULCERATIVE COLITIS WITH RECTAL BLEEDING, UNSPECIFIED LOCATION (HCC): ICD-10-CM

## 2018-03-10 DIAGNOSIS — R17 ELEVATED BILIRUBIN: ICD-10-CM

## 2018-03-10 LAB
C DIFF DNA SPEC QL NAA+PROBE: NEGATIVE
C DIFF TOX GENS STL QL NAA+PROBE: NEGATIVE

## 2018-03-10 PROCEDURE — 87046 STOOL CULTR AEROBIC BACT EA: CPT

## 2018-03-10 PROCEDURE — 87045 FECES CULTURE AEROBIC BACT: CPT

## 2018-03-10 PROCEDURE — 87899 AGENT NOS ASSAY W/OPTIC: CPT

## 2018-03-10 PROCEDURE — 87493 C DIFF AMPLIFIED PROBE: CPT

## 2018-03-11 LAB
E COLI SXT1+2 STL IA: NORMAL
SIGNIFICANT IND 70042: NORMAL
SITE SITE: NORMAL
SOURCE SOURCE: NORMAL

## 2018-03-13 ENCOUNTER — OFFICE VISIT (OUTPATIENT)
Dept: MEDICAL GROUP | Facility: MEDICAL CENTER | Age: 43
End: 2018-03-13
Attending: INTERNAL MEDICINE
Payer: COMMERCIAL

## 2018-03-13 VITALS
OXYGEN SATURATION: 100 % | RESPIRATION RATE: 16 BRPM | BODY MASS INDEX: 33.59 KG/M2 | SYSTOLIC BLOOD PRESSURE: 122 MMHG | HEART RATE: 100 BPM | WEIGHT: 214 LBS | HEIGHT: 67 IN | DIASTOLIC BLOOD PRESSURE: 82 MMHG | TEMPERATURE: 98 F

## 2018-03-13 DIAGNOSIS — G89.29 CHRONIC NONINTRACTABLE HEADACHE, UNSPECIFIED HEADACHE TYPE: ICD-10-CM

## 2018-03-13 DIAGNOSIS — K51.911 ULCERATIVE COLITIS WITH RECTAL BLEEDING, UNSPECIFIED LOCATION (HCC): ICD-10-CM

## 2018-03-13 DIAGNOSIS — R51.9 CHRONIC NONINTRACTABLE HEADACHE, UNSPECIFIED HEADACHE TYPE: ICD-10-CM

## 2018-03-13 PROBLEM — Z12.4 SCREENING FOR MALIGNANT NEOPLASM OF CERVIX: Status: RESOLVED | Noted: 2018-02-20 | Resolved: 2018-03-13

## 2018-03-13 PROBLEM — N89.8 VAGINAL ITCHING: Status: RESOLVED | Noted: 2018-02-20 | Resolved: 2018-03-13

## 2018-03-13 PROBLEM — K51.90 ULCERATIVE COLITIS (HCC): Status: ACTIVE | Noted: 2018-03-13

## 2018-03-13 LAB
BACTERIA STL CULT: NORMAL
E COLI SXT1+2 STL IA: NORMAL
SIGNIFICANT IND 70042: NORMAL
SITE SITE: NORMAL
SOURCE SOURCE: NORMAL

## 2018-03-13 PROCEDURE — 99214 OFFICE O/P EST MOD 30 MIN: CPT | Performed by: INTERNAL MEDICINE

## 2018-03-13 PROCEDURE — 99212 OFFICE O/P EST SF 10 MIN: CPT | Performed by: INTERNAL MEDICINE

## 2018-03-13 RX ORDER — MESALAMINE 800 MG/1
1 TABLET, DELAYED RELEASE ORAL 3 TIMES DAILY
Qty: 90 TAB | Refills: 1 | Status: SHIPPED | OUTPATIENT
Start: 2018-03-13 | End: 2018-03-22 | Stop reason: SDUPTHER

## 2018-03-13 ASSESSMENT — PAIN SCALES - GENERAL: PAINLEVEL: 3=SLIGHT PAIN

## 2018-03-14 NOTE — ASSESSMENT & PLAN NOTE
Patient reports a history of ulcerative colitis which was diagnosed over 5 years ago by colonoscopy. She stated at that time she was having a lot of blood in her bowel movements and was told she had quite a large ulceration. She was offered surgery but she was able to control her symptoms by changing her diet completely. So week or so, she has started to have significant diarrhea and blood in her bowel movements as well as lower abdominal pain. She denies fevers and chills. States that she has had some episodes of fecal incontinence due to urgency. She was seen in the office several days ago and had stool studies done. She was negative for C. difficile as well as Shigella, Salmonella, Enterobacter. She is not currently have a GI doctor and she was not on any treatment for the ulcerative colitis as she was in remission.

## 2018-03-14 NOTE — PROGRESS NOTES
"Subjective:   Gerald Stein is a 42 y.o. female here today for persistent bloody diarrhea    Ulcerative colitis (CMS-[facility])  Patient reports a history of ulcerative colitis which was diagnosed over 5 years ago by colonoscopy. She stated at that time she was having a lot of blood in her bowel movements and was told she had quite a large ulceration. She was offered surgery but she was able to control her symptoms by changing her diet completely. So week or so, she has started to have significant diarrhea and blood in her bowel movements as well as lower abdominal pain. She denies fevers and chills. States that she has had some episodes of fecal incontinence due to urgency. She was seen in the office several days ago and had stool studies done. She was negative for C. difficile as well as Shigella, Salmonella, Enterobacter. She is not currently have a GI doctor and she was not on any treatment for the ulcerative colitis as she was in remission.     Headache  Patient reports headaches continue to be severe and daily. She states that she did try getting glasses however they broke after a week so she's not sure whether they will have a significant impact on her headaches are not. Insurance didn't approve an MRI of her brain which needs to be ordered today.       Current medicines (including changes today)  Current Outpatient Prescriptions   Medication Sig Dispense Refill   • Mesalamine 800 MG Tablet Delayed Response Take 1 Tab by mouth 3 times a day for 42 days. 90 Tab 1   • ibuprofen (MOTRIN) 600 MG Tab Take 600 mg by mouth every 6 hours as needed.       No current facility-administered medications for this visit.      She  has a past medical history of Head ache and Obesity.    ROS   As above in HPI     Objective:     Blood pressure 122/82, pulse 100, temperature 36.7 °C (98 °F), resp. rate 16, height 1.702 m (5' 7.01\"), weight 97.1 kg (214 lb), SpO2 100 %, not currently breastfeeding. Body mass index is 33.51 kg/m². "   Physical Exam:  Constitutional: Alert, no distress, non toxic appearing.  Skin: Warm, dry, good turgor, no rashes in visible areas.  Eye: Equal, round and reactive, conjunctiva clear, lids normal.  Abdomen: Soft, non-tender, no masses, no hepatosplenomegaly.      Results and Imaging:      Ref. Range 3/10/2018 11:20 3/10/2018 11:20   [id number] Presumptive Latest Ref Range: Negative  Negative    C Diff by PCR Latest Ref Range: Negative  Negative    Significant Indicator Unknown NEG NEG   Site Unknown STOOL STOOL   Source Unknown STL STL       Assessment and Plan:   The following treatment plan was discussed    1. Ulcerative colitis with rectal bleeding, unspecified location (CMS-HCC)  Appears to be having a flare of ulcerative colitis. Her insurance will cover oral mesalamine. We will treat her as an acute flare with 800 mg 3 times a day for 6 weeks. Miranda has a GI referral pending and she was given that information today. It was stressed the importance of following up with GI. We discussed that if her symptoms worsen, she develops fevers or worsening abdominal pain, or more bleeding that she needs to go to the emergency room.  - Mesalamine 800 MG Tablet Delayed Response; Take 1 Tab by mouth 3 times a day for 42 days.  Dispense: 90 Tab; Refill: 1  -Follow-up with GI  -ER precautions discussed    2. Chronic nonintractable headache, unspecified headache type  Order placed for MRI brain today. See discussion about headache from previous visits  - MR-BRAIN-W/O; Future        Followup: Return in about 2 weeks (around 3/27/2018), or if symptoms worsen or fail to improve, for diarrhea.

## 2018-03-14 NOTE — ASSESSMENT & PLAN NOTE
Patient reports headaches continue to be severe and daily. She states that she did try getting glasses however they broke after a week so she's not sure whether they will have a significant impact on her headaches are not. Insurance didn't approve an MRI of her brain which needs to be ordered today.

## 2018-03-22 ENCOUNTER — OFFICE VISIT (OUTPATIENT)
Dept: MEDICAL GROUP | Facility: MEDICAL CENTER | Age: 43
End: 2018-03-22
Attending: INTERNAL MEDICINE
Payer: COMMERCIAL

## 2018-03-22 VITALS
OXYGEN SATURATION: 100 % | RESPIRATION RATE: 16 BRPM | SYSTOLIC BLOOD PRESSURE: 110 MMHG | WEIGHT: 211 LBS | TEMPERATURE: 98.1 F | DIASTOLIC BLOOD PRESSURE: 78 MMHG | HEIGHT: 67 IN | HEART RATE: 110 BPM | BODY MASS INDEX: 33.12 KG/M2

## 2018-03-22 DIAGNOSIS — K51.911 ULCERATIVE COLITIS WITH RECTAL BLEEDING, UNSPECIFIED LOCATION (HCC): ICD-10-CM

## 2018-03-22 PROCEDURE — 99213 OFFICE O/P EST LOW 20 MIN: CPT | Performed by: INTERNAL MEDICINE

## 2018-03-22 PROCEDURE — 99214 OFFICE O/P EST MOD 30 MIN: CPT | Performed by: INTERNAL MEDICINE

## 2018-03-22 RX ORDER — MESALAMINE 800 MG/1
1 TABLET, DELAYED RELEASE ORAL 3 TIMES DAILY
Qty: 90 TAB | Refills: 0 | Status: SHIPPED | OUTPATIENT
Start: 2018-03-22 | End: 2018-03-22

## 2018-03-22 RX ORDER — IBUPROFEN 600 MG/1
600 TABLET ORAL EVERY 6 HOURS PRN
Qty: 60 TAB | Refills: 2 | Status: SHIPPED | OUTPATIENT
Start: 2018-03-22 | End: 2019-01-02

## 2018-03-22 RX ORDER — MESALAMINE 400 MG/1
800 CAPSULE, DELAYED RELEASE ORAL 3 TIMES DAILY
Qty: 180 CAP | Refills: 0 | Status: SHIPPED | OUTPATIENT
Start: 2018-03-22 | End: 2018-04-21

## 2018-03-22 RX ORDER — MESALAMINE 4 G/60ML
4000 SUSPENSION RECTAL
Qty: 30 ENEMA | Refills: 0 | Status: SHIPPED | OUTPATIENT
Start: 2018-03-22 | End: 2018-04-21

## 2018-03-22 RX ORDER — PREDNISONE 20 MG/1
TABLET ORAL
Qty: 39 TAB | Refills: 0 | Status: SHIPPED | OUTPATIENT
Start: 2018-03-22 | End: 2018-04-19

## 2018-03-22 ASSESSMENT — PAIN SCALES - GENERAL: PAINLEVEL: 3=SLIGHT PAIN

## 2018-03-22 NOTE — PROGRESS NOTES
"Subjective:   Gerald Stein is a 42 y.o. female here today for follow-up ulcerative colitis flare    Ulcerative colitis (CMS-HCC)  Unfortunately, patient was not able to  the mesalamine that we prescribed because the prior authorization for it was denied. She was referred to GI however when she called our office she was told that they do not accept her insurance. She continues to have bloody bowel movements with at least 3 nocturnal awakenings for diarrhea. She continues to have mild abdominal pain. She denies fevers chills. She is understandably very frustrated with her insurance company.       Current medicines (including changes today)  Current Outpatient Prescriptions   Medication Sig Dispense Refill   • ibuprofen (MOTRIN) 600 MG Tab Take 1 Tab by mouth every 6 hours as needed. 60 Tab 2   • mesalamine delayed-release (DELZICOL) 400 MG CAPSULE DELAYED RELEASE Take 2 Caps by mouth 3 times a day for 30 days. 180 Cap 0   • mesalamine (ROWASA) 4 GM Enema Insert 1 Enema in rectum every bedtime for 30 days. 30 Enema 0   • predniSONE (DELTASONE) 20 MG Tab Take 2 tabs by mouth daily for 14 days then take 1 tab daily for 7 days, then 1/2 tab daily for 7 days then stop 39 Tab 0     No current facility-administered medications for this visit.      She  has a past medical history of Head ache; Obesity; and Ulcerative colitis (CMS-HCC).    ROS   As above in HPI     Objective:     Blood pressure 110/78, pulse (!) 110, temperature 36.7 °C (98.1 °F), resp. rate 16, height 1.702 m (5' 7.01\"), weight 95.7 kg (211 lb), SpO2 100 %, not currently breastfeeding. Body mass index is 33.04 kg/m².   Physical Exam:  Constitutional: Alert, no distress, tearful throughout interview.  Skin: Warm, dry, good turgor, no rashes in visible areas.  Eye: Equal, round and reactive, conjunctiva clear, lids normal.  Abdomen: Soft, non-tender, no masses, no hepatosplenomegaly.        Assessment and Plan:   The following treatment plan was " discussed    1. Ulcerative colitis with rectal bleeding, unspecified location (CMS-HCC)  I have instructed patient to use our pharmacy and I have sent over several different mesalamine prescriptions for them to run to see if any of these would be approved. Unfortunately the initial prescription we sent was denied. I have also placed a new referral to GI and contacted our referrals coordinator regarding any possibility for her to be seen in the [facility] and letting them know that the previous referral was not accepted by the referring office. I have provided patient with a letter that she plans to submit to her insurance company stating that she needs to see a GI doctor and that none are contracted in the [facility] so that she would benefit from an insurance plan with coverage for a local GI doctor.  We also discussed that an alternative is to go to the hospital and be seen in the emergency room. We discussed that she would be able to see a gastroenterologist this way and probably get the treatment that she needs. If we are unable to get her the mesalamine or she is still not able to get into a GI doctor, unfortunately this may be her best feasible option if she continues to have bleeding and diarrhea consistent with a flare of her ulcerative colitis.  Unfortunately all mesalamine scrips are being denied by insurance. Therefore I have sent over for a prednisone taper. I would like her to follow-up with me after she finishes.  - REFERRAL TO GASTROENTEROLOGY  -mesalamine scripts for enema and 400 mg tablets submitted given 800 mg tablets denied.  All require a PA  -prednisone 40 mg x 14 days, then 20 mg x 7 days, then 10 mg x 7 days then stop  -ER precautions given      Followup: Return in about 4 weeks (around 4/19/2018), or if symptoms worsen or fail to improve, for diarrhea.

## 2018-03-22 NOTE — LETTER
March 22, 2018      To whom it may concern:    Gerald Stein is a patient currently under my care at the [facility].  With her current medical problems, she needs to see a gastroenterologist.  She is unable to travel to [place] for this, and needs to have a medicaid plan that covers a local GI specialist.  Because of this, [place] is not currently meeting her needs.    If you have any questions or concerns, please don't hesitate to call.        Sincerely,        Elise Garcia M.D.    Electronically Signed

## 2018-03-22 NOTE — ASSESSMENT & PLAN NOTE
Unfortunately, patient was not able to  the mesalamine that we prescribed because the prior authorization for it was denied. She was referred to GI however when she called our office she was told that they do not accept her insurance. She continues to have bloody bowel movements with at least 3 nocturnal awakenings for diarrhea. She continues to have mild abdominal pain. She denies fevers chills. She is understandably very frustrated with her insurance company.

## 2018-03-23 ENCOUNTER — HOSPITAL ENCOUNTER (OUTPATIENT)
Dept: RADIOLOGY | Facility: MEDICAL CENTER | Age: 43
End: 2018-03-23
Attending: INTERNAL MEDICINE
Payer: COMMERCIAL

## 2018-03-23 DIAGNOSIS — G89.29 CHRONIC NONINTRACTABLE HEADACHE, UNSPECIFIED HEADACHE TYPE: ICD-10-CM

## 2018-03-23 DIAGNOSIS — R51.9 CHRONIC NONINTRACTABLE HEADACHE, UNSPECIFIED HEADACHE TYPE: ICD-10-CM

## 2018-03-23 PROCEDURE — 70551 MRI BRAIN STEM W/O DYE: CPT

## 2018-03-28 ENCOUNTER — TELEPHONE (OUTPATIENT)
Dept: MEDICAL GROUP | Facility: MEDICAL CENTER | Age: 43
End: 2018-03-28

## 2018-03-28 NOTE — TELEPHONE ENCOUNTER
Informed Pt , Pt stated she is in the process of trying to switch her medicaid so she can see a Dr in the area.

## 2018-03-28 NOTE — TELEPHONE ENCOUNTER
----- Message from Elise Garcia M.D. sent at 3/23/2018 12:27 PM PDT -----  Regarding: FW: GI referral  Will you call jenny and let her know that we talked with Dr. Pruett's office.  They are contracted with [facility] but are turning patients away due to no payments from [facility].  She will not be able to see GI consultants in [place].  Only option per Yamila is [facility].  Thanks!.  KM  ----- Message -----  From: Yamial Hanson  Sent: 3/23/2018  12:15 PM  To: Elise Garcia M.D.  Subject: RE: GI referral                                   HI DR GARCIA,    I CALLED DR PRUETT'S  OFFICE SPOKE TO CARROL SHE STATES THEY ARE CONTRACTED BUT TURNING PT'S AWAY DO TO NO PAYMENTS FROM [facility] MEDICAID PLAN. [facility] IS NOT CONTRACTED SO THIS PATIENT WILL NEED TO GO ONTO [facility].    SINCERELY  YAMILA    ----- Message -----  From: Elise Garcia M.D.  Sent: 3/22/2018   9:53 AM  To: Yamila Hanson  Subject: GI referral                                      Hi Yamila,  Do you know if [facility] patients can be seen by GI consultants?  The above patient was referred to Dr. Morgan but when she called their office she was told they do not have an existing contract with [facility] and could not see her.  Does she have any alternatives besides [place]?  I put in a new referral for her today so that it can be re-routed.      Thanks so much for your help!  .Elise Garcia M.D.

## 2018-09-08 ENCOUNTER — HOSPITAL ENCOUNTER (EMERGENCY)
Facility: MEDICAL CENTER | Age: 43
End: 2018-09-08
Attending: EMERGENCY MEDICINE
Payer: MEDICAID

## 2018-09-08 VITALS
OXYGEN SATURATION: 100 % | WEIGHT: 218.92 LBS | RESPIRATION RATE: 18 BRPM | HEART RATE: 59 BPM | BODY MASS INDEX: 34.28 KG/M2 | TEMPERATURE: 97.1 F | SYSTOLIC BLOOD PRESSURE: 128 MMHG | DIASTOLIC BLOOD PRESSURE: 87 MMHG

## 2018-09-08 DIAGNOSIS — R51.9 ACUTE NONINTRACTABLE HEADACHE, UNSPECIFIED HEADACHE TYPE: ICD-10-CM

## 2018-09-08 LAB
ALBUMIN SERPL BCP-MCNC: 4 G/DL (ref 3.2–4.9)
ALBUMIN/GLOB SERPL: 1.3 G/DL
ALP SERPL-CCNC: 82 U/L (ref 30–99)
ALT SERPL-CCNC: 33 U/L (ref 2–50)
ANION GAP SERPL CALC-SCNC: 7 MMOL/L (ref 0–11.9)
AST SERPL-CCNC: 27 U/L (ref 12–45)
BASOPHILS # BLD AUTO: 0.9 % (ref 0–1.8)
BASOPHILS # BLD: 0.06 K/UL (ref 0–0.12)
BILIRUB SERPL-MCNC: 1.5 MG/DL (ref 0.1–1.5)
BUN SERPL-MCNC: 10 MG/DL (ref 8–22)
CALCIUM SERPL-MCNC: 9.4 MG/DL (ref 8.5–10.5)
CHLORIDE SERPL-SCNC: 111 MMOL/L (ref 96–112)
CO2 SERPL-SCNC: 20 MMOL/L (ref 20–33)
CREAT SERPL-MCNC: 0.8 MG/DL (ref 0.5–1.4)
EOSINOPHIL # BLD AUTO: 0.27 K/UL (ref 0–0.51)
EOSINOPHIL NFR BLD: 3.8 % (ref 0–6.9)
ERYTHROCYTE [DISTWIDTH] IN BLOOD BY AUTOMATED COUNT: 43.8 FL (ref 35.9–50)
GLOBULIN SER CALC-MCNC: 3 G/DL (ref 1.9–3.5)
GLUCOSE SERPL-MCNC: 94 MG/DL (ref 65–99)
HCG SERPL QL: NEGATIVE
HCT VFR BLD AUTO: 41.2 % (ref 37–47)
HGB BLD-MCNC: 14.6 G/DL (ref 12–16)
IMM GRANULOCYTES # BLD AUTO: 0.04 K/UL (ref 0–0.11)
IMM GRANULOCYTES NFR BLD AUTO: 0.6 % (ref 0–0.9)
LYMPHOCYTES # BLD AUTO: 3.04 K/UL (ref 1–4.8)
LYMPHOCYTES NFR BLD: 43.1 % (ref 22–41)
MCH RBC QN AUTO: 30.4 PG (ref 27–33)
MCHC RBC AUTO-ENTMCNC: 35.4 G/DL (ref 33.6–35)
MCV RBC AUTO: 85.8 FL (ref 81.4–97.8)
MONOCYTES # BLD AUTO: 0.52 K/UL (ref 0–0.85)
MONOCYTES NFR BLD AUTO: 7.4 % (ref 0–13.4)
NEUTROPHILS # BLD AUTO: 3.12 K/UL (ref 2–7.15)
NEUTROPHILS NFR BLD: 44.2 % (ref 44–72)
NRBC # BLD AUTO: 0 K/UL
NRBC BLD-RTO: 0 /100 WBC
PLATELET # BLD AUTO: 173 K/UL (ref 164–446)
PMV BLD AUTO: 11 FL (ref 9–12.9)
POTASSIUM SERPL-SCNC: 4 MMOL/L (ref 3.6–5.5)
PROT SERPL-MCNC: 7 G/DL (ref 6–8.2)
RBC # BLD AUTO: 4.8 M/UL (ref 4.2–5.4)
SODIUM SERPL-SCNC: 138 MMOL/L (ref 135–145)
WBC # BLD AUTO: 7.1 K/UL (ref 4.8–10.8)

## 2018-09-08 PROCEDURE — 96372 THER/PROPH/DIAG INJ SC/IM: CPT

## 2018-09-08 PROCEDURE — 80053 COMPREHEN METABOLIC PANEL: CPT

## 2018-09-08 PROCEDURE — 99284 EMERGENCY DEPT VISIT MOD MDM: CPT

## 2018-09-08 PROCEDURE — 96375 TX/PRO/DX INJ NEW DRUG ADDON: CPT

## 2018-09-08 PROCEDURE — 96374 THER/PROPH/DIAG INJ IV PUSH: CPT

## 2018-09-08 PROCEDURE — 84703 CHORIONIC GONADOTROPIN ASSAY: CPT

## 2018-09-08 PROCEDURE — 85025 COMPLETE CBC W/AUTO DIFF WBC: CPT

## 2018-09-08 PROCEDURE — 36415 COLL VENOUS BLD VENIPUNCTURE: CPT

## 2018-09-08 PROCEDURE — 700111 HCHG RX REV CODE 636 W/ 250 OVERRIDE (IP): Performed by: EMERGENCY MEDICINE

## 2018-09-08 PROCEDURE — 700105 HCHG RX REV CODE 258: Performed by: EMERGENCY MEDICINE

## 2018-09-08 RX ORDER — KETOROLAC TROMETHAMINE 30 MG/ML
30 INJECTION, SOLUTION INTRAMUSCULAR; INTRAVENOUS ONCE
Status: COMPLETED | OUTPATIENT
Start: 2018-09-08 | End: 2018-09-08

## 2018-09-08 RX ORDER — SUMATRIPTAN 6 MG/.5ML
6 INJECTION, SOLUTION SUBCUTANEOUS ONCE
Status: COMPLETED | OUTPATIENT
Start: 2018-09-08 | End: 2018-09-08

## 2018-09-08 RX ORDER — ONDANSETRON 2 MG/ML
4 INJECTION INTRAMUSCULAR; INTRAVENOUS ONCE
Status: COMPLETED | OUTPATIENT
Start: 2018-09-08 | End: 2018-09-08

## 2018-09-08 RX ORDER — HYDROMORPHONE HYDROCHLORIDE 2 MG/ML
1 INJECTION, SOLUTION INTRAMUSCULAR; INTRAVENOUS; SUBCUTANEOUS ONCE
Status: COMPLETED | OUTPATIENT
Start: 2018-09-08 | End: 2018-09-08

## 2018-09-08 RX ORDER — SODIUM CHLORIDE 9 MG/ML
1000 INJECTION, SOLUTION INTRAVENOUS ONCE
Status: COMPLETED | OUTPATIENT
Start: 2018-09-08 | End: 2018-09-08

## 2018-09-08 RX ADMIN — SUMATRIPTAN 6 MG: 6 INJECTION, SOLUTION SUBCUTANEOUS at 10:28

## 2018-09-08 RX ADMIN — SODIUM CHLORIDE 1000 ML: 9 INJECTION, SOLUTION INTRAVENOUS at 10:40

## 2018-09-08 RX ADMIN — KETOROLAC TROMETHAMINE 30 MG: 30 INJECTION, SOLUTION INTRAMUSCULAR; INTRAVENOUS at 10:42

## 2018-09-08 RX ADMIN — HYDROMORPHONE HYDROCHLORIDE 1 MG: 2 INJECTION INTRAMUSCULAR; INTRAVENOUS; SUBCUTANEOUS at 10:40

## 2018-09-08 RX ADMIN — ONDANSETRON 4 MG: 2 INJECTION INTRAMUSCULAR; INTRAVENOUS at 10:40

## 2018-09-08 ASSESSMENT — LIFESTYLE VARIABLES: DO YOU DRINK ALCOHOL: NO

## 2018-09-08 ASSESSMENT — PAIN SCALES - GENERAL
PAINLEVEL_OUTOF10: 0
PAINLEVEL_OUTOF10: 5

## 2018-09-08 NOTE — ED NOTES
Pt discharge home. Pt given discharge instructions . Pt verbalized understanding, all questions answered ,vss upon d/c. Pt steady on feet upon discharge   will be driving pt home

## 2018-09-08 NOTE — ED TRIAGE NOTES
Chief Complaint   Patient presents with   • Head Ache     c/o intermittent head ache since March. had MRI march w/c negative result. states head ache worse today in the left back of head. has been taking tylenol/asa/ibuprofen for pain w/o relief. asking something to help w/the pain.     Neuro intact. AAOX4. Denies any trauma.

## 2018-09-08 NOTE — ED NOTES
Pt ambulated to yellow 60, here for head ache left side radiating to front of her head. She has been taking ibuprofen and tylenol but no relief.

## 2018-09-08 NOTE — ED PROVIDER NOTES
ED Provider Note    CHIEF COMPLAINT  Chief Complaint   Patient presents with   • Head Ache     c/o intermittent head ache since March. had MRI march w/c negative result. states head ache worse today in the left back of head. has been taking tylenol/asa/ibuprofen for pain w/o relief. asking something to help w/the pain.       HPI  Gerald Stein is a 43 y.o. female who presents for evaluation of headache.  The patient states she has been having intermittent headaches since March.  The patient had an MRI in March and I reviewed the results which were normal.  The patient describes a headache that started today in the occipital area radiating around to the front of her head with associated photophobia and mild nausea.  Patient denies recent: Nasal congestion, purulent rhinorrhea, visual disturbance, unilateral motor weakness or paresthesias, cardiopulmonary disorders, gastrointestinal disorders, neck pain, vertigo.  No other acute symptomatology or complaints.    REVIEW OF SYSTEMS  See HPI for further details.  She denies a history of: Hypertension, diabetes, cardiopulmonary disorders.  All other systems negative.    PAST MEDICAL HISTORY  Past Medical History:   Diagnosis Date   • Head ache    • Obesity    • Ulcerative colitis (HCC)        FAMILY HISTORY  Family History   Problem Relation Age of Onset   • Heart Disease Father    • Cancer Father 65        brain   • Cancer Sister         eye lid, skin   • Diabetes Neg Hx    • Stroke Neg Hx        SOCIAL HISTORY  Non-smoker; positive alcohol use; denies drug use;    SURGICAL HISTORY  Past Surgical History:   Procedure Laterality Date   • TUBAL COAGULATION LAPAROSCOPIC BILATERAL     • TUBE & ECTOPIC PREG., REMOVAL         CURRENT MEDICATIONS  Home Medications     Reviewed by Monie Clark R.N. (Registered Nurse) on 09/08/18 at 0925  Med List Status: Complete   Medication Last Dose Status   ibuprofen (MOTRIN) 600 MG Tab 9/7/2018 Active                 ALLERGIES  Allergies   Allergen Reactions   • Morphine Vomiting   • Flagyl [Kdc:Metronidazole+Tartrazine] Rash     Rash         PHYSICAL EXAM  VITAL SIGNS: /87   Pulse 76   Temp 36.2 °C (97.1 °F)   Resp 16   Wt 99.3 kg (218 lb 14.7 oz)   LMP 09/08/2018   SpO2 96%   BMI 34.28 kg/m²    Constitutional: 43-year-old female, well developed, Well nourished, in a dark room, awake, oriented ×3  HENT: Normocephalic, Atraumatic, Nares:Clear, Oropharynx: Mildly dry, posterior pharynx:clear   Eyes: PERRL, EOMI, Conjunctiva normal, No discharge.   Neck: Normal range of motion, No tenderness, Supple, No stridor.   Lymphatic: No lymphadenopathy noted.   Cardiovascular: Regular rate and rhythm without mumurs, gallups, rubs   Thorax & Lungs: Normal Equal breath sounds, No respiratory distress, No wheezing, no stridor, no rales. No chest tenderness.   Abdomen: Soft, nontender, nondistended, no organomegaly, positive bowel sounds normal in quality. No guarding or rebound.  Skin: Slightly decreased skin turgor, pink, warm, dry. No rashes, petechiae, purpura. Normal capillary refill.   Back: No tenderness, No CVA tenderness.   Extremities: Intact distal pulses, No edema, No tenderness, No cyanosis,  Vascular: Pulses are 2+, symmetric in the upper and lower extremities.  Musculoskeletal: Good range of motion in all major joints. No tenderness to palpation or major deformities noted.   Neurologic: Alert & oriented x 3,  No gross focal deficits noted.   Psychiatric: Affect normal, Judgment normal, Mood normal.       RADIOLOGY/PROCEDURES  MRI from March was reviewed and was within normal limits;    COURSE & MEDICAL DECISION MAKING  Pertinent Labs & Imaging studies reviewed. (See chart for details)  1.  IV normal saline; IV fluids administered for clinical dehydration; reevaluation after treatment revealed stable status;  2.  Zofran, titrated  3.  Sumatriptan 6 mg subcutaneous  4.  Dilaudid 1 mg IV  5.  Toradol 30 mg  IV    Laboratory studies: CBC shows white count 7.1, 44% neutrophils, 43% lymphocytes, 7% monocytes, hemoglobin 14.6, hematocrit 41.2; CMP within normal; beta hCG is negative; discussion: At this time, patient presents for evaluation of headache.  Patient symptomatology is highly suggestive of migraine headache. The patient had a previous MRI which was negative.  Laboratory studies are unremarkable.  There is no evidence of: Meningitis, stroke syndrome, subarachnoid hemorrhage, or other conditions that require further imaging studies or workup.  After the above-noted treatment, the patient states she was feeling much better.  At this time, the patient is stable for discharge.  Patient indicates she is comfortable with this explanation disposition.    FINAL IMPRESSION  1. Acute nonintractable headache, unspecified headache type        PLAN  1.  Appropriate discharge instructions given  2.  Follow-up with primary care    Electronically signed by: Guy G Gansert, 9/8/2018 10:07 AM

## 2018-09-13 ENCOUNTER — OFFICE VISIT (OUTPATIENT)
Dept: MEDICAL GROUP | Facility: MEDICAL CENTER | Age: 43
End: 2018-09-13
Attending: INTERNAL MEDICINE
Payer: MEDICAID

## 2018-09-13 VITALS
DIASTOLIC BLOOD PRESSURE: 70 MMHG | WEIGHT: 217 LBS | HEART RATE: 82 BPM | RESPIRATION RATE: 16 BRPM | SYSTOLIC BLOOD PRESSURE: 100 MMHG | BODY MASS INDEX: 34.06 KG/M2 | OXYGEN SATURATION: 99 % | HEIGHT: 67 IN | TEMPERATURE: 98.4 F

## 2018-09-13 DIAGNOSIS — Z23 NEED FOR VACCINATION: ICD-10-CM

## 2018-09-13 DIAGNOSIS — L50.9 HIVES: ICD-10-CM

## 2018-09-13 DIAGNOSIS — F81.0 IMPAIRED READING COMPREHENSION: ICD-10-CM

## 2018-09-13 DIAGNOSIS — G43.009 MIGRAINE WITHOUT AURA AND WITHOUT STATUS MIGRAINOSUS, NOT INTRACTABLE: ICD-10-CM

## 2018-09-13 DIAGNOSIS — F81.9 LEARNING DISABILITY: ICD-10-CM

## 2018-09-13 PROBLEM — R14.0 BLOATING: Status: RESOLVED | Noted: 2018-02-07 | Resolved: 2018-09-13

## 2018-09-13 PROCEDURE — 99213 OFFICE O/P EST LOW 20 MIN: CPT | Performed by: INTERNAL MEDICINE

## 2018-09-13 PROCEDURE — 90471 IMMUNIZATION ADMIN: CPT | Performed by: INTERNAL MEDICINE

## 2018-09-13 PROCEDURE — 90686 IIV4 VACC NO PRSV 0.5 ML IM: CPT | Performed by: INTERNAL MEDICINE

## 2018-09-13 PROCEDURE — 99214 OFFICE O/P EST MOD 30 MIN: CPT | Mod: 25 | Performed by: INTERNAL MEDICINE

## 2018-09-13 RX ORDER — TRIAMCINOLONE ACETONIDE 1 MG/G
CREAM TOPICAL
Qty: 45 G | Refills: 0 | Status: SHIPPED | OUTPATIENT
Start: 2018-09-13 | End: 2019-07-16

## 2018-09-13 RX ORDER — SUMATRIPTAN 100 MG/1
TABLET, FILM COATED ORAL
Qty: 10 TAB | Refills: 1 | Status: SHIPPED | OUTPATIENT
Start: 2018-09-13 | End: 2019-01-04 | Stop reason: SDUPTHER

## 2018-09-13 ASSESSMENT — PAIN SCALES - GENERAL: PAINLEVEL: NO PAIN

## 2018-09-14 NOTE — ASSESSMENT & PLAN NOTE
Patient reports for the past 2 months she has had intermittent bouts of hives.  She reports out of nowhere she will get raised itchy bumps mostly on her stomach, back, and in her scalp.  She denies any new detergents, skin or hair products, or foods.  She reports dramatically increased stress lately.  She was concerned about bedbugs but she has not seen any insects in her home and that the lesions tend to resolve spontaneously after a day or 2.  She will take Benadryl which helps with the itching.  She does not have any of the lesions present today but she does have a picture.

## 2018-09-14 NOTE — ASSESSMENT & PLAN NOTE
Patient reports worsening migraine headaches.  She gets them 2-4 times per month.  Sometimes they are preceded by blurred vision but usually just come on out of the blue.  She has been taking aspirin, Excedrin Migraine, ibuprofen, Tylenol without improvement.  She reports photophobia but denies associated nausea and vomiting.  She usually has to go to sleep in a dark room in order for the headaches to resolve.  Headaches are pounding in character.  She has never been on any migraine specific medication in the past.  She had an MRI brain done which was normal earlier this year.

## 2018-09-14 NOTE — PROGRESS NOTES
"Subjective:   Gerald Stein is a 43 y.o. female here today for completion of paperwork for learning disability, intermittent hives, worsening migraine    Migraine without aura and without status migrainosus, not intractable  Patient reports worsening migraine headaches.  She gets them 2-4 times per month.  Sometimes they are preceded by blurred vision but usually just come on out of the blue.  She has been taking aspirin, Excedrin Migraine, ibuprofen, Tylenol without improvement.  She reports photophobia but denies associated nausea and vomiting.  She usually has to go to sleep in a dark room in order for the headaches to resolve.  Headaches are pounding in character.  She has never been on any migraine specific medication in the past.  She had an MRI brain done which was normal earlier this year.     Hives  Patient reports for the past 2 months she has had intermittent bouts of hives.  She reports out of nowhere she will get raised itchy bumps mostly on her stomach, back, and in her scalp.  She denies any new detergents, skin or hair products, or foods.  She reports dramatically increased stress lately.  She was concerned about bedbugs but she has not seen any insects in her home and that the lesions tend to resolve spontaneously after a day or 2.  She will take Benadryl which helps with the itching.  She does not have any of the lesions present today but she does have a picture.    Learning disability  Patient reports that in the past she has struggled with a learning disability.  She states that she was in \"special education\" from elementary school to high school.  She is currently at [employer] trying to work on her college degree.  She states that she has difficulty with reading and comprehension and she is much slower at processing although she still understands concepts.  She is requesting paperwork to be completed to allow her to have longer time to take tests.  She is not aware of any formal diagnoses of " "learning disabilities that she has had in the past.       Current medicines (including changes today)  Current Outpatient Prescriptions   Medication Sig Dispense Refill   • sumatriptan (IMITREX) 100 MG tablet Take 1/2 to 1 full tab as needed at first sign of migraine headache 10 Tab 1   • triamcinolone acetonide (KENALOG) 0.1 % Cream Apply thin layer to itchy rash twice a day as needed 45 g 0   • ibuprofen (MOTRIN) 600 MG Tab Take 1 Tab by mouth every 6 hours as needed. 60 Tab 2     No current facility-administered medications for this visit.      She  has a past medical history of Head ache; Obesity; and Ulcerative colitis (HCC).    ROS   As above in HPI     Objective:     Blood pressure 100/70, pulse 82, temperature 36.9 °C (98.4 °F), resp. rate 16, height 1.702 m (5' 7.01\"), weight 98.4 kg (217 lb), last menstrual period 09/08/2018, SpO2 99 %, not currently breastfeeding. Body mass index is 33.98 kg/m².   Physical Exam:  Constitutional: Alert, no distress.  Skin: Warm, dry, good turgor, no rashes in visible areas.  Eye: Equal, round and reactive, conjunctiva clear, lids normal.  Psych: Alert and oriented x3, normal affect and mood.    Assessment and Plan:   The following treatment plan was discussed    1. Learning disability  I have completed paperwork today for [employer] disability resource center for the patient so that she can get extra time with her test taking.    2. Impaired reading comprehension  As above    3. Migraine without aura and without status migrainosus, not intractable  Currently uncontrolled with over-the-counter NSAIDs.  We will try her on sumatriptan  as needed at the first sign of headaches.  - sumatriptan (IMITREX) 100 MG tablet; Take 1/2 to 1 full tab as needed at first sign of migraine headache  Dispense: 10 Tab; Refill: 1    4. Hives  No rashes today.  At this point, I am concerned for stress triggered hives and she has not changed any of her self care products or foods and the lesions are " not typical for insect bites.  I have given her prescription for Kenalog to try at the onset of symptoms to help with the itching.  She can also continue the Benadryl as needed.  - triamcinolone acetonide (KENALOG) 0.1 % Cream; Apply thin layer to itchy rash twice a day as needed  Dispense: 45 g; Refill: 0    5. Need for vaccination  - Flu Quad Inj >3 Year Pre-Filled PF        Followup: Return in about 6 months (around 3/13/2019), or if symptoms worsen or fail to improve.

## 2018-09-14 NOTE — ASSESSMENT & PLAN NOTE
"Patient reports that in the past she has struggled with a learning disability.  She states that she was in \"special education\" from elementary school to high school.  She is currently at [employer] trying to work on her college degree.  She states that she has difficulty with reading and comprehension and she is much slower at processing although she still understands concepts.  She is requesting paperwork to be completed to allow her to have longer time to take tests.  She is not aware of any formal diagnoses of learning disabilities that she has had in the past.  "

## 2018-12-19 ENCOUNTER — OFFICE VISIT (OUTPATIENT)
Dept: INTERNAL MEDICINE | Facility: MEDICAL CENTER | Age: 43
End: 2018-12-19
Payer: MEDICAID

## 2018-12-19 VITALS
HEART RATE: 83 BPM | HEIGHT: 67 IN | WEIGHT: 220 LBS | OXYGEN SATURATION: 96 % | DIASTOLIC BLOOD PRESSURE: 76 MMHG | SYSTOLIC BLOOD PRESSURE: 115 MMHG | BODY MASS INDEX: 34.53 KG/M2 | TEMPERATURE: 99.1 F

## 2018-12-19 DIAGNOSIS — M79.672 HEEL PAIN, BILATERAL: ICD-10-CM

## 2018-12-19 DIAGNOSIS — M79.671 HEEL PAIN, BILATERAL: ICD-10-CM

## 2018-12-19 PROCEDURE — 99213 OFFICE O/P EST LOW 20 MIN: CPT | Performed by: INTERNAL MEDICINE

## 2018-12-19 RX ORDER — PREDNISONE 20 MG/1
20 TABLET ORAL DAILY
Qty: 5 TAB | Refills: 0 | Status: SHIPPED | OUTPATIENT
Start: 2018-12-19 | End: 2018-12-24

## 2018-12-19 RX ORDER — MELOXICAM 7.5 MG/1
7.5 TABLET ORAL
Qty: 30 TAB | Refills: 0 | Status: SHIPPED | OUTPATIENT
Start: 2018-12-19 | End: 2019-01-02

## 2018-12-19 NOTE — LETTER
December 19, 2018        Gerald Stein  [id number]  [place] [address]        To whom it may concern,    Please excuse the patient from work until December 26, 2018 secondary to her medical condition. She may return to work considering she is feeling better.     If you have any questions or concerns, please don't hesitate to call.        Sincerely,        Salome Bautista M.D.    Electronically Signed

## 2018-12-20 ENCOUNTER — HOSPITAL ENCOUNTER (OUTPATIENT)
Dept: RADIOLOGY | Facility: MEDICAL CENTER | Age: 43
End: 2018-12-20
Attending: INTERNAL MEDICINE
Payer: MEDICAID

## 2018-12-20 DIAGNOSIS — M79.672 HEEL PAIN, BILATERAL: ICD-10-CM

## 2018-12-20 DIAGNOSIS — M79.671 HEEL PAIN, BILATERAL: ICD-10-CM

## 2018-12-20 PROCEDURE — 73650 X-RAY EXAM OF HEEL: CPT | Mod: RT

## 2018-12-20 NOTE — PROGRESS NOTES
Gerald Stein is a 43 y.o. female who is here for an acute visit.    CC: Heel pain    HPI:    Patient is a 43-year-old female who is here for an acute visit today.  Patient has complaints of bilateral heel pain which is a lot worse on the right.  She says she cannot step down on her heels bilaterally and has to walk on her tippy toes.  She says she notices pain about a couple of months ago and it has gotten to the point where she is unable to walk today on her right foot.  She has been having foot pain for 2 years and does have chronic pain in her ankles as well.  She also has swelling in her ankles bilaterally but she noticed that a couple months ago, she is had this heel pain.  She does not think it fluctuates at any time of the day and does not think it gets worse at the beginning of the day as compared to the end of the day and is just continuous throughout.  She describes it as a achy pain, no fever, no chills.  She does not complain of any falls or fractures.  She normally wears tennis shoes and does not wear tight shoes or heels.  She is never been diagnosed with plantar fasciitis or any other foot conditions.  She wants to know what is going on because she says she is unable to ambulate and has not been able to go to work.  She does not complain of any joint pain anywhere, no joint stiffness anywhere.  She has not really tried any medication for this or done any exercises.  She does not complain of any rash and range of motion is intact.    No problem-specific Assessment & Plan notes found for this encounter.      She  has a past medical history of Head ache; Obesity; and Ulcerative colitis (HCC).    Patient Active Problem List    Diagnosis Date Noted   • Learning disability 09/13/2018   • Impaired reading comprehension 09/13/2018   • Hives 09/13/2018   • Ulcerative colitis (HCC) 03/13/2018   • Migraine without aura and without status migrainosus, not intractable 02/07/2018   • Obesity (BMI  "30-39.9) 02/07/2018   • Fatigue 02/07/2018       Allergies:Morphine and Flagyl [kdc:metronidazole+tartrazine]    Current Outpatient Prescriptions   Medication Sig Dispense Refill   • meloxicam (MOBIC) 7.5 MG Tab Take 1 Tab by mouth 2 times daily with meals as needed. 30 Tab 0   • predniSONE (DELTASONE) 20 MG Tab Take 1 Tab by mouth every day for 5 days. 5 Tab 0   • sumatriptan (IMITREX) 100 MG tablet Take 1/2 to 1 full tab as needed at first sign of migraine headache 10 Tab 1   • triamcinolone acetonide (KENALOG) 0.1 % Cream Apply thin layer to itchy rash twice a day as needed 45 g 0   • ibuprofen (MOTRIN) 600 MG Tab Take 1 Tab by mouth every 6 hours as needed. 60 Tab 2     No current facility-administered medications for this visit.        Social History   Substance Use Topics   • Smoking status: Former Smoker     Packs/day: 1.00     Years: 10.00     Types: Cigarettes     Quit date: 2/7/2000   • Smokeless tobacco: Never Used   • Alcohol use 3.6 oz/week     6 Cans of beer per week       Family History   Problem Relation Age of Onset   • Heart Disease Father    • Cancer Father 65        brain   • Cancer Sister         eye lid, skin   • Diabetes Neg Hx    • Stroke Neg Hx      Review of Systems:   Pertinent positives as stated in HPI, all others reviewed as negative.    Physical Exam:  Blood pressure 115/76, pulse 83, temperature 37.3 °C (99.1 °F), temperature source Temporal, height 1.702 m (5' 7.01\"), weight 99.8 kg (220 lb), SpO2 96 %, not currently breastfeeding. Body mass index is 34.45 kg/m².    General Appearance: overweight, alert, no distress, cooperative  Skin: No rashes or lesions.  Lungs: Lungs clear to auscultation bilaterally.  Heart: RRR without murmur, gallop, or rubs.  Abdomen: Soft, non-tender. BS normal.   Musculoskeletal: Extremities: Upper and lower extremities appear normal. No deformities, edema. Right foot: Tenderness to palpation even to slight palpation, no findings on inspection, trace edema " noted up to the ankles, ROM intact. Unable to bear down on heel secondary to pain. Left foot: NO gross changes on inspection, mild tenderness to palpation, ROM intact, able to bear weight on heel.   Peripheral Pulses: Pulses: radial=4/4, dorsalis pedis=4/4  Psychiatric: Mood appears normal.     Assessment/Plan:     1. Heel pain, bilateral  Could be severe plantar fascitis.  Will prescribe a course of Mobic and Prednisone for 5 days.  Also, ordered an Xray for any acute changes, stress fracture, signs of infection.  Recommended to call if no improvement.  Also, provided patient with a work note until 12/26.   - meloxicam (MOBIC) 7.5 MG Tab; Take 1 Tab by mouth 2 times daily with meals as needed.  Dispense: 30 Tab; Refill: 0  - predniSONE (DELTASONE) 20 MG Tab; Take 1 Tab by mouth every day for 5 days.  Dispense: 5 Tab; Refill: 0  - DX-OS CALCIS (HEEL) 2+ RIGHT; Future      Followup: Return if symptoms worsen or fail to improve.    This note was created using voice recognition software. There may be unintended errors spelling, and grammar or content.

## 2018-12-20 NOTE — PATIENT INSTRUCTIONS
Plantar Fasciitis  Plantar fasciitis is a painful foot condition that affects the heel. It occurs when the band of tissue that connects the toes to the heel bone (plantar fascia) becomes irritated. This can happen after exercising too much or doing other repetitive activities (overuse injury). The pain from plantar fasciitis can range from mild irritation to severe pain that makes it difficult for you to walk or move. The pain is usually worse in the morning or after you have been sitting or lying down for a while.  CAUSES  This condition may be caused by:  · Standing for long periods of time.  · Wearing shoes that do not fit.  · Doing high-impact activities, including running, aerobics, and ballet.  · Being overweight.  · Having an abnormal way of walking (gait).  · Having tight calf muscles.  · Having high arches in your feet.  · Starting a new athletic activity.  SYMPTOMS  The main symptom of this condition is heel pain. Other symptoms include:  · Pain that gets worse after activity or exercise.  · Pain that is worse in the morning or after resting.  · Pain that goes away after you walk for a few minutes.  DIAGNOSIS  This condition may be diagnosed based on your signs and symptoms. Your health care provider will also do a physical exam to check for:  · A tender area on the bottom of your foot.  · A high arch in your foot.  · Pain when you move your foot.  · Difficulty moving your foot.  You may also need to have imaging studies to confirm the diagnosis. These can include:  · X-rays.  · Ultrasound.  · MRI.  TREATMENT   Treatment for plantar fasciitis depends on the severity of the condition. Your treatment may include:  · Rest, ice, and over-the-counter pain medicines to manage your pain.  · Exercises to stretch your calves and your plantar fascia.  · A splint that holds your foot in a stretched, upward position while you sleep (night splint).  · Physical therapy to relieve symptoms and prevent problems in the  future.  · Cortisone injections to relieve severe pain.  · Extracorporeal shock wave therapy (ESWT) to stimulate damaged plantar fascia with electrical impulses. It is often used as a last resort before surgery.  · Surgery, if other treatments have not worked after 12 months.  HOME CARE INSTRUCTIONS  · Take medicines only as directed by your health care provider.  · Avoid activities that cause pain.  · Roll the bottom of your foot over a bag of ice or a bottle of cold water. Do this for 20 minutes, 3-4 times a day.  · Perform simple stretches as directed by your health care provider.  · Try wearing athletic shoes with air-sole or gel-sole cushions or soft shoe inserts.  · Wear a night splint while sleeping, if directed by your health care provider.  · Keep all follow-up appointments with your health care provider.  PREVENTION   · Do not perform exercises or activities that cause heel pain.  · Consider finding low-impact activities if you continue to have problems.  · Lose weight if you need to.  The best way to prevent plantar fasciitis is to avoid the activities that aggravate your plantar fascia.  SEEK MEDICAL CARE IF:  · Your symptoms do not go away after treatment with home care measures.  · Your pain gets worse.  · Your pain affects your ability to move or do your daily activities.  This information is not intended to replace advice given to you by your health care provider. Make sure you discuss any questions you have with your health care provider.  Document Released: 09/12/2002 Document Revised: 04/10/2017 Document Reviewed: 10/28/2015  [employer] Patient Education © 2017 [employer]

## 2019-01-02 ENCOUNTER — OFFICE VISIT (OUTPATIENT)
Dept: MEDICAL GROUP | Facility: MEDICAL CENTER | Age: 44
End: 2019-01-02
Attending: INTERNAL MEDICINE
Payer: MEDICAID

## 2019-01-02 ENCOUNTER — TELEPHONE (OUTPATIENT)
Dept: MEDICAL GROUP | Facility: MEDICAL CENTER | Age: 44
End: 2019-01-02

## 2019-01-02 VITALS
RESPIRATION RATE: 16 BRPM | SYSTOLIC BLOOD PRESSURE: 128 MMHG | HEART RATE: 74 BPM | TEMPERATURE: 98.6 F | BODY MASS INDEX: 34.53 KG/M2 | WEIGHT: 220 LBS | HEIGHT: 67 IN | OXYGEN SATURATION: 97 % | DIASTOLIC BLOOD PRESSURE: 82 MMHG

## 2019-01-02 DIAGNOSIS — M79.671 CHRONIC HEEL PAIN, RIGHT: ICD-10-CM

## 2019-01-02 DIAGNOSIS — G89.29 CHRONIC HEEL PAIN, RIGHT: ICD-10-CM

## 2019-01-02 DIAGNOSIS — Z23 NEED FOR VACCINATION: ICD-10-CM

## 2019-01-02 DIAGNOSIS — K51.911 ULCERATIVE COLITIS WITH RECTAL BLEEDING, UNSPECIFIED LOCATION (HCC): ICD-10-CM

## 2019-01-02 PROCEDURE — 99214 OFFICE O/P EST MOD 30 MIN: CPT | Performed by: INTERNAL MEDICINE

## 2019-01-02 PROCEDURE — 99213 OFFICE O/P EST LOW 20 MIN: CPT | Mod: 25 | Performed by: INTERNAL MEDICINE

## 2019-01-02 PROCEDURE — 90715 TDAP VACCINE 7 YRS/> IM: CPT

## 2019-01-02 RX ORDER — LIDOCAINE 50 MG/G
OINTMENT TOPICAL
Qty: 1 TUBE | Refills: 1 | Status: SHIPPED | OUTPATIENT
Start: 2019-01-02 | End: 2019-07-16

## 2019-01-02 RX ORDER — MESALAMINE 4 G/60ML
SUSPENSION RECTAL
Qty: 30 ENEMA | Refills: 1 | Status: SHIPPED | OUTPATIENT
Start: 2019-01-02 | End: 2021-03-29

## 2019-01-02 RX ORDER — MESALAMINE 800 MG/1
1 TABLET, DELAYED RELEASE ORAL 3 TIMES DAILY
Qty: 90 TAB | Refills: 1 | Status: SHIPPED | OUTPATIENT
Start: 2019-01-02 | End: 2019-08-31 | Stop reason: SDUPTHER

## 2019-01-02 RX ORDER — MELOXICAM 7.5 MG/1
7.5 TABLET ORAL DAILY
Qty: 60 TAB | Refills: 1 | Status: SHIPPED | OUTPATIENT
Start: 2019-01-02 | End: 2019-05-14 | Stop reason: SDUPTHER

## 2019-01-02 ASSESSMENT — PATIENT HEALTH QUESTIONNAIRE - PHQ9: CLINICAL INTERPRETATION OF PHQ2 SCORE: 0

## 2019-01-02 ASSESSMENT — PAIN SCALES - GENERAL: PAINLEVEL: 8=MODERATE-SEVERE PAIN

## 2019-01-02 NOTE — TELEPHONE ENCOUNTER
1. Caller Name: Gerald Stein                                           Call Back Number: [phone] (home)         Patient approves a detailed voicemail message: yes    Pt left a message. Pt went to pharmacy to  Rx, but the sig was incorrect on the Rx. Sig states one pill daily needs to be one pill 2 times daily. Dispense was written correctly just the sig needs to be updated. Please advise.

## 2019-01-02 NOTE — ASSESSMENT & PLAN NOTE
Patient reports a history [place] GI doctor.  Since she has been under my care for about the past year, she has not been on any maintenance medication although she has exhibited signs of flares.  She reports recently, she has had blood in her bowel movements about 3 days/week.  There will be times where she passes flatus and also expels mucus that is blood-tinged.  She reports some mild lower abdominal pain that comes and goes.  She had not been able to establish with GI because there were no local providers excepting her insurance, however she was able to switch Medicaid carriers and is now able to establish locally.  She has some leftover mesalamine enemas which she has been using intermittently.  She denies fevers and chills.

## 2019-01-02 NOTE — PROGRESS NOTES
Subjective:   Gerald Stein is a 43 y.o. female here today for Pain in her heel, concern for flare of UC    Chronic heel pain, right  She reports that for about 2 months, she has had severe pain over her right heel.  She was seen by a different provider for this and treated with a short course of meloxicam and steroids.  She reports improvement in symptoms slightly while taking these medications, however she continues to have pain that is preventing her from working.  She has been walking on her toe on the right foot to avoid bearing weight which triggers the pain.  She denies any rashes or lesion on her foot and no recent injuries.  Her pain is at the very base of her heel where her Achilles tendon attaches and then radiates towards the toes across the sole of her foot.  States that it is worse in the mornings and the evenings and seems to subside during the day.  It is so tender that she has to keep her foot propped up on a pillow at night because it hurts to rest her heel on the bed.  She has tried numerous insoles in her shoes and even purchased new shoes without improvement in her symptoms.  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      Ulcerative colitis (CMS-[facility]) ([facility])  Patient reports a history of ulcerative colitis [place] by [place] previous GI doctor.  Since she has been under my care for about the past year, she has not been on any maintenance medication although she has exhibited signs of flares.  She reports recently, she has had blood in her bowel movements about 3 days/week.  There will be times where she passes flatus and also expels mucus that is blood-tinged.  She reports some mild lower abdominal pain that comes and goes.  She had not been able to establish with GI because there were no local providers excepting her insurance, however she was able to switch Medicaid carriers and is now able to establish locally.  She has some leftover mesalamine  "enemas which she has been using intermittently.  She denies fevers and chills.        Current medicines (including changes today)  Current Outpatient Prescriptions   Medication Sig Dispense Refill   • meloxicam (MOBIC) 7.5 MG Tab Take 1 Tab by mouth every day. 60 Tab 1   • lidocaine (XYLOCAINE) 5 % Ointment Apply small amount to painful area of heel up to three times a day if needed 1 Tube 1   • mesalamine (ROWASA) 4 GM Enema Insert 1 enema at night 30 Enema 1   • Mesalamine 800 MG Tablet Delayed Response Take 1 Cap by mouth 3 times a day for 42 days. 90 Tab 1   • sumatriptan (IMITREX) 100 MG tablet Take 1/2 to 1 full tab as needed at first sign of migraine headache 10 Tab 1   • triamcinolone acetonide (KENALOG) 0.1 % Cream Apply thin layer to itchy rash twice a day as needed 45 g 0     No current facility-administered medications for this visit.      She  has a past medical history of Head ache; Obesity; and Ulcerative colitis (HCC).    ROS   Denies chest pain, shortness of breath  As above in HPI     Objective:     Blood pressure 128/82, pulse 74, temperature 37 °C (98.6 °F), temperature source Temporal, resp. rate 16, height 1.702 m (5' 7.01\"), weight 99.8 kg (220 lb), SpO2 97 %, not currently breastfeeding. Body mass index is 34.45 kg/m².   Physical Exam:  Constitutional: Alert, no distress.  Skin: Warm, dry, good turgor, no rashes in visible areas.  Eye: Equal, round and reactive, conjunctiva clear, lids normal.  Abdomen: Soft, non-tender, no masses, no hepatosplenomegaly.  Extremities: Right heel appears grossly normal.  There is tenderness to palpation just over insertion of Achilles tendon into the calcaneus that is moderate to severe, no pain with dorsiflexion or plantarflexion at the ankle, no tenderness over most of the sole of the foot except for at the very posterior heel    Results and Imaging:   X ray foot (12/20/18):      FINDINGS:  No acute fracture.  Normal trabecular pattern. Small inferior " calcaneal spur is identified. Achilles shadow is within normal limits.   Impression       1.  No acute fracture or malalignment.    2.  Small inferior calcaneal spur is identified.         Assessment and Plan:   The following treatment plan was discussed    1. Chronic heel pain, right  She does have a small calcaneal spur however we discussed that this is probably not because of her pain.  I am more concerned for an Achilles tendinitis however it did not respond to conservative therapy of NSAIDs for 7-10 days, ice, and rest.  At this point, she was doing better on meloxicam than the ibuprofen so I have refilled that for her.  I have also given her a prescription for some topical lidocaine to see if this might help with some of the skin sensitivity she experiences nocturnally.  We will have her follow-up with Dr. Kumar at [facility].  - meloxicam (MOBIC) 7.5 MG Tab; Take 1 Tab by mouth every day.  Dispense: 60 Tab; Refill: 1  - lidocaine (XYLOCAINE) 5 % Ointment; Apply small amount to painful area of heel up to three times a day if needed  Dispense: 1 Tube; Refill: 1  - REFERRAL TO ORTHOPEDICS    2. Ulcerative colitis with rectal bleeding, unspecified location (HCC)  She needs to establish with GI.  I have refilled her mesalamine enemas which we know are covered.  I have also prescribed mesalamine oral which we have not been able to get covered in the past, but she has a new insurance now.  We discussed that if the mesalamine oral medication is denied by insurance, she should continue to use the enemas nocturnally and get in with GI when able.  - REFERRAL TO GASTROENTEROLOGY  - mesalamine (ROWASA) 4 GM Enema; Insert 1 enema at night  Dispense: 30 Enema; Refill: 1  - Mesalamine 800 MG Tablet Delayed Response; Take 1 Cap by mouth 3 times a day for 42 days.  Dispense: 90 Tab; Refill: 1    3. Need for vaccination  - Tdap =>6yo IM        Followup: Return if symptoms worsen or fail to improve.

## 2019-01-02 NOTE — ASSESSMENT & PLAN NOTE
She reports that for about 2 months, she has had severe pain over her right heel.  She was seen by a different provider for this and treated with a short course of meloxicam and steroids.  She reports improvement in symptoms slightly while taking these medications, however she continues to have pain that is preventing her from working.  She has been walking on her toe on the right foot to avoid bearing weight which triggers the pain.  She denies any rashes or lesion on her foot and no recent injuries.  Her pain is at the very base of her heel where her Achilles tendon attaches and then radiates towards the toes across the sole of her foot.  States that it is worse in the mornings and the evenings and seems to subside during the day.  It is so tender that she has to keep her foot propped up on a pillow at night because it hurts to rest her heel on the bed.  She has tried numerous insoles in her shoes and even purchased new shoes without improvement in her symptoms.  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

## 2019-01-04 DIAGNOSIS — G43.009 MIGRAINE WITHOUT AURA AND WITHOUT STATUS MIGRAINOSUS, NOT INTRACTABLE: ICD-10-CM

## 2019-01-04 RX ORDER — SUMATRIPTAN 100 MG/1
TABLET, FILM COATED ORAL
Qty: 9 TAB | Refills: 3 | Status: SHIPPED | OUTPATIENT
Start: 2019-01-04 | End: 2021-03-29

## 2019-01-07 ENCOUNTER — TELEPHONE (OUTPATIENT)
Dept: MEDICAL GROUP | Facility: MEDICAL CENTER | Age: 44
End: 2019-01-07

## 2019-01-07 ENCOUNTER — PATIENT MESSAGE (OUTPATIENT)
Dept: MEDICAL GROUP | Facility: MEDICAL CENTER | Age: 44
End: 2019-01-07

## 2019-01-07 NOTE — TELEPHONE ENCOUNTER
DOCUMENTATION OF PAR STATUS:    1. Name of Medication & Dose: Mesalamine     2. Name of Prescription Coverage Company & phone #: [employer]     3. Date Prior Auth Submitted: 1/7/19    4. What information was given to obtain insurance decision? Office Notes, Med Hx, Dx    5. Prior Auth Status? Pending    6. Patient Notified: yes

## 2019-01-07 NOTE — TELEPHONE ENCOUNTER
FINAL PRIOR AUTHORIZATION STATUS:    1.  Name of Medication & Dose: mesalamine 800 mg      2. Prior Auth Status: Approved through 1/7/2020     3. Action Taken: Pharmacy Notified: yes Patient Notified: yes

## 2019-03-15 ENCOUNTER — OFFICE VISIT (OUTPATIENT)
Dept: MEDICAL GROUP | Facility: MEDICAL CENTER | Age: 44
End: 2019-03-15
Attending: INTERNAL MEDICINE
Payer: MEDICAID

## 2019-03-15 VITALS
WEIGHT: 225 LBS | DIASTOLIC BLOOD PRESSURE: 78 MMHG | HEIGHT: 67 IN | OXYGEN SATURATION: 96 % | TEMPERATURE: 99.2 F | BODY MASS INDEX: 35.31 KG/M2 | SYSTOLIC BLOOD PRESSURE: 138 MMHG | RESPIRATION RATE: 16 BRPM | HEART RATE: 74 BPM

## 2019-03-15 DIAGNOSIS — R41.840 DIFFICULTY CONCENTRATING: ICD-10-CM

## 2019-03-15 DIAGNOSIS — F81.9 LEARNING DISABILITY: ICD-10-CM

## 2019-03-15 PROCEDURE — 99214 OFFICE O/P EST MOD 30 MIN: CPT | Performed by: INTERNAL MEDICINE

## 2019-03-15 PROCEDURE — 99212 OFFICE O/P EST SF 10 MIN: CPT | Performed by: INTERNAL MEDICINE

## 2019-03-15 NOTE — ASSESSMENT & PLAN NOTE
Learning disability since she was a kid.  No school records.  Needs help with testing skills.  Hard time processing numbers. Sometimes 68 or 86.  She uses a soft ware for reading.  Back to school 126 math and 102 English.  No neurologist

## 2019-03-15 NOTE — ASSESSMENT & PLAN NOTE
New to discuss, uncontrolled problem.  She told me that she has a history of learning disability since childhood and has difficulty with mathematics processing during her school years.  She also has reading problems and currently using a software to help her with reading.  In general, she tells me sometimes she makes numbers while saying them, for example, instead of 68 she was to 86.  She stated that she was not able to retrieve any of her records from any school because he has been more than 20 years.  She is going back to school now and realized that she has more difficulties than she thought.  She is taking some college classes for English language and mathematics.  She would like to have a formal evaluation by a neurologist for her disability.

## 2019-03-15 NOTE — PROGRESS NOTES
Chief Complaint   Patient presents with   • Referral Needed     mixing up numbers       Subjective:     HPI:   Gerald presents today with the following.    Learning disability  New to discuss, uncontrolled problem.  She told me that she has a history of learning disability since childhood and has difficulty with mathematics processing during her school years.  She also has reading problems and currently using a software to help her with reading.  In general, she tells me sometimes she makes numbers while saying them, for example, instead of 68 she was to 86.  She stated that she was not able to retrieve any of her records from any school because he has been more than 20 years.  She is going back to school now and realized that she has more difficulties than she thought.  She is taking some college classes for English language and mathematics.  She would like to have a formal evaluation by a neurologist for her disability.          Patient Active Problem List    Diagnosis Date Noted   • Difficulty concentrating 03/15/2019   • Chronic heel pain, right 01/02/2019   • Learning disability 09/13/2018   • Impaired reading comprehension 09/13/2018   • Hives 09/13/2018   • Ulcerative colitis (HCC) 03/13/2018   • Migraine without aura and without status migrainosus, not intractable 02/07/2018   • Obesity (BMI 30-39.9) 02/07/2018   • Fatigue 02/07/2018       Current Outpatient Prescriptions   Medication Sig Dispense Refill   • sumatriptan (IMITREX) 100 MG tablet TAKE 1/2 TO 1 FULL TAB AS NEEDED AT FIRST SIGN OF MIGRAINE HEADACHE 9 Tab 3   • meloxicam (MOBIC) 7.5 MG Tab Take 1 Tab by mouth every day. 60 Tab 1   • lidocaine (XYLOCAINE) 5 % Ointment Apply small amount to painful area of heel up to three times a day if needed 1 Tube 1   • mesalamine (ROWASA) 4 GM Enema Insert 1 enema at night 30 Enema 1   • triamcinolone acetonide (KENALOG) 0.1 % Cream Apply thin layer to itchy rash twice a day as needed 45 g 0     No current  "facility-administered medications for this visit.        Allergies as of 03/15/2019 - Reviewed 03/15/2019   Allergen Reaction Noted   • Morphine Vomiting 03/22/2018   • Flagyl [kdc:metronidazole+tartrazine] Rash 04/15/2017        Past Medical History:   Diagnosis Date   • Head ache    • Obesity    • Ulcerative colitis (HCC)        Past Surgical History:   Procedure Laterality Date   • TUBAL COAGULATION LAPAROSCOPIC BILATERAL     • TUBE & ECTOPIC PREG., REMOVAL         Social History   Substance Use Topics   • Smoking status: Former Smoker     Packs/day: 1.00     Years: 10.00     Types: Cigarettes     Quit date: 2/7/2000   • Smokeless tobacco: Never Used   • Alcohol use 3.6 oz/week     6 Cans of beer per week       Family History   Problem Relation Age of Onset   • Heart Disease Father    • Cancer Father 65        brain   • Cancer Sister         eye lid, skin   • Diabetes Neg Hx    • Stroke Neg Hx        ROS:     - Constitutional: Negative for fever, chills, unexpected weight change, and fatigue/generalized weakness.     - HEENT: Negative for headaches, vision changes, hearing changes, ear pain, ear discharge, sinus congestion, or sore throat.     - Respiratory: Negative for cough, sputum production, dyspnea and wheezing.    - Cardiovascular: Negative for chest pain or palpitations.      - Gastrointestinal: Negative for heartburn, nausea, vomiting, abdominal pain, diarrhea or constipation.     - Genitourinary: Negative for dysuria, polyuria or urinary urgency.    - Musculoskeletal: Negative for myalgias, back pain, and joint pain.     - Skin: Negative for rash, itching, cyanotic skin color change.     - Psychiatric/Behavioral: Negative for depression or suicidal/homicidal ideation.       Physical Exam:     Blood pressure 138/78, pulse 74, temperature 37.3 °C (99.2 °F), temperature source Temporal, resp. rate 16, height 1.702 m (5' 7.01\"), weight 102.1 kg (225 lb), SpO2 96 %, not currently breastfeeding. Body mass " index is 35.23 kg/m².   Gen:         Alert and oriented, No apparent distress.  Neck:        No Lymphadenopathy or Bruits.  Lungs:     Clear to auscultation bilaterally  CV:          Regular rate and rhythm. No murmurs, rubs or gallops.       Ext:          No clubbing, cyanosis, edema.  MMSE score 30/30    Data:     Imaging: MRI brain December 2018 normal results reviewed and discussed with the patient, questions answered.      Assessment and Plan:     43 y.o. female with the following issues.    1. Learning disability  New to discuss, chronic uncontrolled problem.  As discussed in HPI, patient seems to have a history of learning disability since childhood.  Problem is more exacerbating with going back to college at this time.  Would like to proceed with neurology evaluation.  I will order baseline labs as below, including thyroid and B12.  I reviewed her MRI brain from end of 2018 and it was normal.  Her MMSE exam score 30/30 in the office although she has pronounced difficulty counting down from 100.  She will do her labs before neurology evaluation.  - TSH WITH REFLEX TO FT4; Future  - CBC WITH DIFFERENTIAL; Future  - VITAMIN B12; Future  - Comp Metabolic Panel; Future  - REFERRAL TO NEUROLOGY    Follow Up:      Return for PRN with PCP.      Please note that this dictation was created using voice recognition software. I have made every reasonable attempt to correct obvious errors, but I expect that there are errors of grammar and possibly content that I did not discover before finalizing the note.    Signed by: Marla Guy

## 2019-03-25 ENCOUNTER — HOSPITAL ENCOUNTER (OUTPATIENT)
Dept: LAB | Facility: MEDICAL CENTER | Age: 44
End: 2019-03-25
Attending: INTERNAL MEDICINE
Payer: MEDICAID

## 2019-03-25 DIAGNOSIS — F81.9 LEARNING DISABILITY: ICD-10-CM

## 2019-03-25 LAB
ALBUMIN SERPL BCP-MCNC: 4.1 G/DL (ref 3.2–4.9)
ALBUMIN/GLOB SERPL: 1.5 G/DL
ALP SERPL-CCNC: 67 U/L (ref 30–99)
ALT SERPL-CCNC: 22 U/L (ref 2–50)
ANION GAP SERPL CALC-SCNC: 7 MMOL/L (ref 0–11.9)
AST SERPL-CCNC: 27 U/L (ref 12–45)
BASOPHILS # BLD AUTO: 1.4 % (ref 0–1.8)
BASOPHILS # BLD: 0.1 K/UL (ref 0–0.12)
BILIRUB SERPL-MCNC: 1.2 MG/DL (ref 0.1–1.5)
BUN SERPL-MCNC: 14 MG/DL (ref 8–22)
CALCIUM SERPL-MCNC: 10.4 MG/DL (ref 8.5–10.5)
CHLORIDE SERPL-SCNC: 106 MMOL/L (ref 96–112)
CO2 SERPL-SCNC: 23 MMOL/L (ref 20–33)
CREAT SERPL-MCNC: 0.95 MG/DL (ref 0.5–1.4)
EOSINOPHIL # BLD AUTO: 0.38 K/UL (ref 0–0.51)
EOSINOPHIL NFR BLD: 5.3 % (ref 0–6.9)
ERYTHROCYTE [DISTWIDTH] IN BLOOD BY AUTOMATED COUNT: 44 FL (ref 35.9–50)
GLOBULIN SER CALC-MCNC: 2.8 G/DL (ref 1.9–3.5)
GLUCOSE SERPL-MCNC: 78 MG/DL (ref 65–99)
HCT VFR BLD AUTO: 44.6 % (ref 37–47)
HGB BLD-MCNC: 15.2 G/DL (ref 12–16)
IMM GRANULOCYTES # BLD AUTO: 0.05 K/UL (ref 0–0.11)
IMM GRANULOCYTES NFR BLD AUTO: 0.7 % (ref 0–0.9)
LYMPHOCYTES # BLD AUTO: 2.66 K/UL (ref 1–4.8)
LYMPHOCYTES NFR BLD: 37 % (ref 22–41)
MCH RBC QN AUTO: 32.1 PG (ref 27–33)
MCHC RBC AUTO-ENTMCNC: 34.1 G/DL (ref 33.6–35)
MCV RBC AUTO: 94.3 FL (ref 81.4–97.8)
MONOCYTES # BLD AUTO: 0.66 K/UL (ref 0–0.85)
MONOCYTES NFR BLD AUTO: 9.2 % (ref 0–13.4)
NEUTROPHILS # BLD AUTO: 3.33 K/UL (ref 2–7.15)
NEUTROPHILS NFR BLD: 46.4 % (ref 44–72)
NRBC # BLD AUTO: 0 K/UL
NRBC BLD-RTO: 0 /100 WBC
PLATELET # BLD AUTO: 173 K/UL (ref 164–446)
PMV BLD AUTO: 11.8 FL (ref 9–12.9)
POTASSIUM SERPL-SCNC: 3.8 MMOL/L (ref 3.6–5.5)
PROT SERPL-MCNC: 6.9 G/DL (ref 6–8.2)
RBC # BLD AUTO: 4.73 M/UL (ref 4.2–5.4)
SODIUM SERPL-SCNC: 136 MMOL/L (ref 135–145)
TSH SERPL DL<=0.005 MIU/L-ACNC: 1.5 UIU/ML (ref 0.38–5.33)
VIT B12 SERPL-MCNC: 273 PG/ML (ref 211–911)
WBC # BLD AUTO: 7.2 K/UL (ref 4.8–10.8)

## 2019-03-25 PROCEDURE — 84443 ASSAY THYROID STIM HORMONE: CPT

## 2019-03-25 PROCEDURE — 80053 COMPREHEN METABOLIC PANEL: CPT

## 2019-03-25 PROCEDURE — 85025 COMPLETE CBC W/AUTO DIFF WBC: CPT

## 2019-03-25 PROCEDURE — 36415 COLL VENOUS BLD VENIPUNCTURE: CPT

## 2019-03-25 PROCEDURE — 82607 VITAMIN B-12: CPT

## 2019-04-03 ENCOUNTER — OFFICE VISIT (OUTPATIENT)
Dept: MEDICAL GROUP | Facility: MEDICAL CENTER | Age: 44
End: 2019-04-03
Attending: FAMILY MEDICINE
Payer: MEDICAID

## 2019-04-03 VITALS
TEMPERATURE: 97.1 F | SYSTOLIC BLOOD PRESSURE: 118 MMHG | WEIGHT: 226 LBS | OXYGEN SATURATION: 97 % | HEIGHT: 67 IN | RESPIRATION RATE: 14 BRPM | HEART RATE: 67 BPM | DIASTOLIC BLOOD PRESSURE: 80 MMHG | BODY MASS INDEX: 35.47 KG/M2

## 2019-04-03 DIAGNOSIS — H92.09 OTALGIA, UNSPECIFIED LATERALITY: ICD-10-CM

## 2019-04-03 DIAGNOSIS — J01.00 ACUTE MAXILLARY SINUSITIS, RECURRENCE NOT SPECIFIED: ICD-10-CM

## 2019-04-03 PROCEDURE — 99212 OFFICE O/P EST SF 10 MIN: CPT | Performed by: FAMILY MEDICINE

## 2019-04-03 PROCEDURE — 99214 OFFICE O/P EST MOD 30 MIN: CPT | Performed by: FAMILY MEDICINE

## 2019-04-03 RX ORDER — AMOXICILLIN 500 MG/1
500 CAPSULE ORAL 3 TIMES DAILY
Qty: 30 CAP | Refills: 0 | Status: SHIPPED | OUTPATIENT
Start: 2019-04-03 | End: 2019-05-15

## 2019-04-03 RX ORDER — FLUTICASONE PROPIONATE 50 MCG
2 SPRAY, SUSPENSION (ML) NASAL DAILY
Qty: 16 G | Refills: 11 | Status: SHIPPED | OUTPATIENT
Start: 2019-04-03 | End: 2021-03-29

## 2019-04-03 ASSESSMENT — ENCOUNTER SYMPTOMS
SPUTUM PRODUCTION: 0
PSYCHIATRIC NEGATIVE: 1
SHORTNESS OF BREATH: 0
PALPITATIONS: 0
SINUS PAIN: 1
COUGH: 1
FEVER: 1
NECK PAIN: 0
ABDOMINAL PAIN: 0
HEADACHES: 0
DIARRHEA: 0
VOMITING: 0
RHINORRHEA: 1
CHILLS: 0
SORE THROAT: 1

## 2019-04-03 NOTE — PROGRESS NOTES
"Subjective:      Gerald Stein is a 43 y.o. female who presents with Otalgia (right)            Otalgia    There is pain in both ears. This is a new problem. The current episode started in the past 7 days. The problem occurs constantly. The problem has been gradually worsening. Maximum temperature: feels feverish. Associated symptoms include coughing, rhinorrhea and a sore throat. Pertinent negatives include no abdominal pain, diarrhea, ear discharge, headaches, hearing loss, neck pain, rash or vomiting. Treatments tried: We will have her start by using Flonase nasal saline as directed.  A prescription of amoxicillin will be sent to her pharmacy for her to use if her symptoms do not improve or worsen.       Review of Systems   Constitutional: Positive for fever. Negative for chills.   HENT: Positive for congestion, ear pain, rhinorrhea, sinus pain and sore throat. Negative for ear discharge and hearing loss.    Respiratory: Positive for cough. Negative for sputum production and shortness of breath.    Cardiovascular: Negative for chest pain and palpitations.   Gastrointestinal: Negative for abdominal pain, diarrhea and vomiting.   Musculoskeletal: Negative for neck pain.   Skin: Negative for rash.   Neurological: Negative for headaches.   Psychiatric/Behavioral: Negative.           Objective:     /80 (BP Location: Left arm, Patient Position: Sitting)   Pulse 67   Temp 36.2 °C (97.1 °F)   Resp 14   Ht 1.702 m (5' 7\")   Wt 102.5 kg (226 lb)   SpO2 97%   BMI 35.40 kg/m²      Physical Exam   Constitutional: She is oriented to person, place, and time.   BMI 35.40   HENT:   Head: Normocephalic and atraumatic.   Congestion, postnasal drip with pharyngeal erythema, bilateral TM dullness   Neck: Normal range of motion. Neck supple. No thyromegaly present.   Cardiovascular: Normal rate, regular rhythm and normal heart sounds.  Exam reveals no friction rub.    No murmur heard.  Pulmonary/Chest: Effort " normal and breath sounds normal. No respiratory distress. She has no wheezes. She has no rales.   Abdominal: Soft. Bowel sounds are normal. She exhibits no distension. There is no tenderness.   Lymphadenopathy:     She has cervical adenopathy.   Neurological: She is alert and oriented to person, place, and time.   Skin: Skin is warm and dry.   Psychiatric: She has a normal mood and affect. Her behavior is normal.   Nursing note and vitals reviewed.              Assessment/Plan:     1. Otalgia, unspecified laterality  We will have patient use Flonase as well as nasal saline as directed, if she has no improvement over the next 7-10 days or if she has significant worsening we will have her start amoxicillin as directed.  We will continue to follow.  - fluticasone (FLONASE) 50 MCG/ACT nasal spray; Spray 2 Sprays in nose every day.  Dispense: 16 g; Refill: 11  - amoxicillin (AMOXIL) 500 MG Cap; Take 1 Cap by mouth 3 times a day.  Dispense: 30 Cap; Refill: 0    2. Acute maxillary sinusitis, recurrence not specified  See above plan.  - fluticasone (FLONASE) 50 MCG/ACT nasal spray; Spray 2 Sprays in nose every day.  Dispense: 16 g; Refill: 11  - amoxicillin (AMOXIL) 500 MG Cap; Take 1 Cap by mouth 3 times a day.  Dispense: 30 Cap; Refill: 0

## 2019-05-14 DIAGNOSIS — G89.29 CHRONIC HEEL PAIN, RIGHT: ICD-10-CM

## 2019-05-14 DIAGNOSIS — M79.671 CHRONIC HEEL PAIN, RIGHT: ICD-10-CM

## 2019-05-14 RX ORDER — MELOXICAM 7.5 MG/1
TABLET ORAL
Qty: 60 TAB | Refills: 1 | Status: SHIPPED | OUTPATIENT
Start: 2019-05-14 | End: 2019-10-16 | Stop reason: SDUPTHER

## 2019-05-15 ENCOUNTER — OFFICE VISIT (OUTPATIENT)
Dept: MEDICAL GROUP | Facility: MEDICAL CENTER | Age: 44
End: 2019-05-15
Attending: INTERNAL MEDICINE
Payer: MEDICAID

## 2019-05-15 VITALS
WEIGHT: 230 LBS | TEMPERATURE: 98.3 F | SYSTOLIC BLOOD PRESSURE: 128 MMHG | DIASTOLIC BLOOD PRESSURE: 82 MMHG | HEIGHT: 67 IN | BODY MASS INDEX: 36.1 KG/M2 | HEART RATE: 66 BPM

## 2019-05-15 DIAGNOSIS — K51.919 ULCERATIVE COLITIS WITH COMPLICATION, UNSPECIFIED LOCATION (HCC): ICD-10-CM

## 2019-05-15 DIAGNOSIS — N30.00 ACUTE CYSTITIS WITHOUT HEMATURIA: ICD-10-CM

## 2019-05-15 DIAGNOSIS — R30.0 DYSURIA: ICD-10-CM

## 2019-05-15 LAB
APPEARANCE UR: CLEAR
BILIRUB UR STRIP-MCNC: NEGATIVE MG/DL
COLOR UR AUTO: YELLOW
GLUCOSE UR STRIP.AUTO-MCNC: NEGATIVE MG/DL
KETONES UR STRIP.AUTO-MCNC: NEGATIVE MG/DL
LEUKOCYTE ESTERASE UR QL STRIP.AUTO: NORMAL
NITRITE UR QL STRIP.AUTO: NEGATIVE
PH UR STRIP.AUTO: 5.5 [PH] (ref 5–8)
PROT UR QL STRIP: NEGATIVE MG/DL
RBC UR QL AUTO: NORMAL
SP GR UR STRIP.AUTO: 1.01
UROBILINOGEN UR STRIP-MCNC: 0.2 MG/DL

## 2019-05-15 PROCEDURE — 99212 OFFICE O/P EST SF 10 MIN: CPT | Performed by: INTERNAL MEDICINE

## 2019-05-15 PROCEDURE — 81002 URINALYSIS NONAUTO W/O SCOPE: CPT | Performed by: INTERNAL MEDICINE

## 2019-05-15 PROCEDURE — 99214 OFFICE O/P EST MOD 30 MIN: CPT | Performed by: INTERNAL MEDICINE

## 2019-05-15 RX ORDER — NITROFURANTOIN 25; 75 MG/1; MG/1
100 CAPSULE ORAL 2 TIMES DAILY
Qty: 10 CAP | Refills: 0 | Status: SHIPPED | OUTPATIENT
Start: 2019-05-15 | End: 2019-05-15 | Stop reason: SDUPTHER

## 2019-05-15 RX ORDER — NITROFURANTOIN 25; 75 MG/1; MG/1
100 CAPSULE ORAL 2 TIMES DAILY
Qty: 10 CAP | Refills: 0 | Status: SHIPPED | OUTPATIENT
Start: 2019-05-15 | End: 2019-05-20

## 2019-05-15 RX ORDER — FLUCONAZOLE 150 MG/1
150 TABLET ORAL DAILY
Qty: 1 TAB | Refills: 0 | Status: SHIPPED | OUTPATIENT
Start: 2019-05-15 | End: 2019-07-16

## 2019-05-15 NOTE — PROGRESS NOTES
Subjective:   Gerald Stein is a 43 y.o. female here today for burning with urination, new referral for GI    Dysuria  She reports onset of burning with urination 2 days ago.  She also reports incomplete bladder emptying.  She reports some mild suprapubic pain.  Denies fevers, chills, flank pain, vaginal discharge, hematuria.  States that this feels similar to previous UTIs.  She has not had a UTI for over a year.    Ulcerative colitis (CMS-HCC) (HCC)  She states that she was never able to follow-up with GI for her ulcerative colitis.  She has still not establish care with them.  Her last referral was placed approximately 5 months ago.  She is requesting a new referral today that can be active.       Current medicines (including changes today)  Current Outpatient Prescriptions   Medication Sig Dispense Refill   • fluconazole (DIFLUCAN) 150 MG tablet Take 1 Tab by mouth every day. 1 Tab 0   • nitrofurantoin monohyd macro (MACROBID) 100 MG Cap Take 1 Cap by mouth 2 times a day for 5 days. 10 Cap 0   • meloxicam (MOBIC) 7.5 MG Tab TAKE 1 TABLET BY MOUTH EVERY DAY 60 Tab 1   • fluticasone (FLONASE) 50 MCG/ACT nasal spray Spray 2 Sprays in nose every day. 16 g 11   • sumatriptan (IMITREX) 100 MG tablet TAKE 1/2 TO 1 FULL TAB AS NEEDED AT FIRST SIGN OF MIGRAINE HEADACHE 9 Tab 3   • lidocaine (XYLOCAINE) 5 % Ointment Apply small amount to painful area of heel up to three times a day if needed 1 Tube 1   • mesalamine (ROWASA) 4 GM Enema Insert 1 enema at night 30 Enema 1   • triamcinolone acetonide (KENALOG) 0.1 % Cream Apply thin layer to itchy rash twice a day as needed 45 g 0     No current facility-administered medications for this visit.      She  has a past medical history of Head ache; Obesity; and Ulcerative colitis ([facility]).    ROS   Denies chest pain, shortness of breath  As above in HPI     Objective:     Vitals:    05/15/19 1509   BP: 128/82   Pulse: 66   Temp: 36.8 °C (98.3 °F)     Body mass index is  36.02 kg/m².   Physical Exam:  Constitutional: Alert, no distress.  Skin: Warm, dry, good turgor, no rashes in visible areas.  Eye: Equal, round and reactive, conjunctiva clear, lids normal.  Abdomen: Soft, obese, minimal tenderness to palpation over suprapubic region, no masses, no hepatosplenomegaly.  Back: No CVA tenderness    Results:  POC UA in clinic today showed negative nitrate, moderate leuk esterase    Assessment and Plan:   The following treatment plan was discussed    1. Dysuria  - POCT Urinalysis    2. Ulcerative colitis with complication, unspecified location (HCC)  Suspect that GI no longer has her existing referral and I have placed a new one today.  She is currently asymptomatic from an ulcerative colitis standpoint, but she would benefit from establishing care with GI for history of recurrent flares.  - REFERRAL TO GASTROENTEROLOGY    3. Acute cystitis without hematuria  We will treat with antibiotics as below.  Prescription for Diflucan given because she generally gets yeast infections following antibiotic therapy.  - nitrofurantoin monohyd macro (MACROBID) 100 MG Cap; Take 1 Cap by mouth 2 times a day for 5 days.  Dispense: 10 Cap; Refill: 0  -diflucan 150 mg, take only for signs of yeast infection      Followup: Return if symptoms worsen or fail to improve.

## 2019-05-15 NOTE — ASSESSMENT & PLAN NOTE
She states that she was never able to follow-up with GI for her ulcerative colitis.  She has still not establish care with them.  Her last referral was placed approximately 5 months ago.  She is requesting a new referral today that can be active.

## 2019-05-15 NOTE — ASSESSMENT & PLAN NOTE
She reports onset of burning with urination 2 days ago.  She also reports incomplete bladder emptying.  She reports some mild suprapubic pain.  Denies fevers, chills, flank pain, vaginal discharge, hematuria.  States that this feels similar to previous UTIs.  She has not had a UTI for over a year.

## 2019-07-16 ENCOUNTER — OFFICE VISIT (OUTPATIENT)
Dept: MEDICAL GROUP | Facility: MEDICAL CENTER | Age: 44
End: 2019-07-16
Attending: INTERNAL MEDICINE
Payer: MEDICAID

## 2019-07-16 VITALS
HEART RATE: 86 BPM | WEIGHT: 221 LBS | BODY MASS INDEX: 34.69 KG/M2 | RESPIRATION RATE: 16 BRPM | SYSTOLIC BLOOD PRESSURE: 102 MMHG | HEIGHT: 67 IN | TEMPERATURE: 97.4 F | DIASTOLIC BLOOD PRESSURE: 72 MMHG | OXYGEN SATURATION: 98 %

## 2019-07-16 DIAGNOSIS — R05.9 COUGH: ICD-10-CM

## 2019-07-16 DIAGNOSIS — J30.2 SEASONAL ALLERGIES: ICD-10-CM

## 2019-07-16 PROBLEM — R30.0 DYSURIA: Status: RESOLVED | Noted: 2019-05-15 | Resolved: 2019-07-16

## 2019-07-16 PROBLEM — N30.00 ACUTE CYSTITIS: Status: RESOLVED | Noted: 2019-05-15 | Resolved: 2019-07-16

## 2019-07-16 PROCEDURE — 99213 OFFICE O/P EST LOW 20 MIN: CPT | Performed by: INTERNAL MEDICINE

## 2019-07-16 PROCEDURE — 99214 OFFICE O/P EST MOD 30 MIN: CPT | Performed by: INTERNAL MEDICINE

## 2019-07-16 RX ORDER — LORATADINE 10 MG/1
10 TABLET ORAL DAILY
Qty: 30 TAB | COMMUNITY
Start: 2019-07-16

## 2019-07-16 RX ORDER — TRIAMCINOLONE ACETONIDE 40 MG/ML
40 INJECTION, SUSPENSION INTRA-ARTICULAR; INTRAMUSCULAR ONCE
Status: COMPLETED | OUTPATIENT
Start: 2019-07-16 | End: 2019-07-16

## 2019-07-16 RX ORDER — BENZONATATE 100 MG/1
100 CAPSULE ORAL 3 TIMES DAILY PRN
Qty: 30 CAP | Refills: 0 | Status: SHIPPED | OUTPATIENT
Start: 2019-07-16 | End: 2020-01-09

## 2019-07-16 RX ADMIN — TRIAMCINOLONE ACETONIDE 40 MG: 40 INJECTION, SUSPENSION INTRA-ARTICULAR; INTRAMUSCULAR at 12:29

## 2019-07-16 ASSESSMENT — PAIN SCALES - GENERAL: PAINLEVEL: 3=SLIGHT PAIN

## 2019-07-16 NOTE — ASSESSMENT & PLAN NOTE
She reports her allergies have been more severe lately.  She is been needing to take Claritin 2-3 times a day in addition to Benadryl.  She reports persistent runny nose and postnasal drip.  The Flonase has been somewhat helpful.  She feels like this is the trigger for her cough as discussed above.

## 2019-07-16 NOTE — ASSESSMENT & PLAN NOTE
States she has had a persistent cough for the past 2 weeks.  It is getting a little bit worse and she is having some associated urinary incontinence.  She complains of postnasal drip.  She denies fevers, chills, shortness of breath, recent upper respiratory infection, myalgias, sick contacts, pain in her sinus region or ears.  She reports a dry throat with some slight soreness.

## 2019-07-16 NOTE — PROGRESS NOTES
Subjective:   Gerald Stein is a 43 y.o. female here today for cough, uncontrolled allergy symptoms despite OTC meds    Seasonal allergies  She reports her allergies have been more severe lately.  She is been needing to take Claritin 2-3 times a day in addition to Benadryl.  She reports persistent runny nose and postnasal drip.  The Flonase has been somewhat helpful.  She feels like this is the trigger for her cough as discussed above.    Cough  States she has had a persistent cough for the past 2 weeks.  It is getting a little bit worse and she is having some associated urinary incontinence.  She complains of postnasal drip.  She denies fevers, chills, shortness of breath, recent upper respiratory infection, myalgias, sick contacts, pain in her sinus region or ears.  She reports a dry throat with some slight soreness.       Current medicines (including changes today)  Current Outpatient Prescriptions   Medication Sig Dispense Refill   • benzonatate (TESSALON) 100 MG Cap Take 1 Cap by mouth 3 times a day as needed for Cough. 30 Cap 0   • loratadine (CLARITIN) 10 MG Tab Take 1 Tab by mouth every day. 30 Tab    • meloxicam (MOBIC) 7.5 MG Tab TAKE 1 TABLET BY MOUTH EVERY DAY 60 Tab 1   • fluticasone (FLONASE) 50 MCG/ACT nasal spray Spray 2 Sprays in nose every day. 16 g 11   • sumatriptan (IMITREX) 100 MG tablet TAKE 1/2 TO 1 FULL TAB AS NEEDED AT FIRST SIGN OF MIGRAINE HEADACHE 9 Tab 3   • mesalamine (ROWASA) 4 GM Enema Insert 1 enema at night 30 Enema 1     No current facility-administered medications for this visit.      She  has a past medical history of Head ache; Obesity; and Ulcerative colitis (HCC).    ROS   Denies chest pain, shortness of breath  As above in HPI     Objective:     Vitals:    07/16/19 1217   BP: 102/72   Pulse: 86   Resp: 16   Temp: 36.3 °C (97.4 °F)   SpO2: 98%     Body mass index is 34.61 kg/m².   Physical Exam:  Constitutional: Alert, no distress.  Skin: Warm, dry, good turgor, no  rashes in visible areas.  Eye: Equal, round and reactive, conjunctiva clear, lids normal.  ENMT: Lips without lesions, fair dentition, oropharynx clear, TM's clear bilaterally  Neck: Trachea midline, no masses, no thyromegaly. No cervical or supraclavicular lymphadenopathy  Respiratory: Unlabored respiratory effort, lungs clear to auscultation, no wheezes, no ronchi.      Assessment and Plan:   The following treatment plan was discussed    1. Seasonal allergies  Uncontrolled despite multiple doses of Claritin and sometimes Benadryl during the day as well as Flonase.  We will do a Kenalog shot as I do think her allergies have exacerbated the cough discussed below.  -Continue Claritin if needed  -Continue daily Flonase  - triamcinolone acetonide (KENALOG-40) injection 40 mg; 1 mL by Intramuscular route Once.    2. Cough  Suspect secondary to postnasal drip.  Less likely infectious in origin.  We will treat as above with Kenalog and Tessalon Perles to help break the cough cycle.  She will let us know if no improvement in her symptoms over the next week.  - benzonatate (TESSALON) 100 MG Cap; Take 1 Cap by mouth 3 times a day as needed for Cough.  Dispense: 30 Cap; Refill: 0        Followup: Return if symptoms worsen or fail to improve.

## 2019-08-31 DIAGNOSIS — K51.911 ULCERATIVE COLITIS WITH RECTAL BLEEDING, UNSPECIFIED LOCATION (HCC): ICD-10-CM

## 2019-09-03 RX ORDER — MESALAMINE 800 MG/1
TABLET, DELAYED RELEASE ORAL
Qty: 90 TAB | Refills: 5 | Status: SHIPPED | OUTPATIENT
Start: 2019-09-03 | End: 2021-03-29

## 2019-09-03 RX ORDER — MESALAMINE 800 MG/1
1 TABLET, DELAYED RELEASE ORAL 3 TIMES DAILY
Refills: 1 | COMMUNITY
Start: 2019-08-06 | End: 2019-09-03

## 2019-10-16 DIAGNOSIS — M79.671 CHRONIC HEEL PAIN, RIGHT: ICD-10-CM

## 2019-10-16 DIAGNOSIS — G89.29 CHRONIC HEEL PAIN, RIGHT: ICD-10-CM

## 2019-10-16 RX ORDER — MELOXICAM 7.5 MG/1
7.5 TABLET ORAL
Qty: 30 TAB | Refills: 3 | Status: SHIPPED | OUTPATIENT
Start: 2019-10-16 | End: 2021-03-29

## 2019-11-05 ENCOUNTER — APPOINTMENT (OUTPATIENT)
Dept: RADIOLOGY | Facility: MEDICAL CENTER | Age: 44
End: 2019-11-05
Attending: EMERGENCY MEDICINE
Payer: MEDICAID

## 2019-11-05 ENCOUNTER — HOSPITAL ENCOUNTER (EMERGENCY)
Facility: MEDICAL CENTER | Age: 44
End: 2019-11-05
Attending: EMERGENCY MEDICINE
Payer: MEDICAID

## 2019-11-05 VITALS
RESPIRATION RATE: 20 BRPM | TEMPERATURE: 98.4 F | DIASTOLIC BLOOD PRESSURE: 96 MMHG | WEIGHT: 220 LBS | SYSTOLIC BLOOD PRESSURE: 141 MMHG | HEART RATE: 82 BPM | HEIGHT: 66 IN | BODY MASS INDEX: 35.36 KG/M2

## 2019-11-05 DIAGNOSIS — S93.402A SPRAIN OF LEFT ANKLE, UNSPECIFIED LIGAMENT, INITIAL ENCOUNTER: ICD-10-CM

## 2019-11-05 PROCEDURE — 96374 THER/PROPH/DIAG INJ IV PUSH: CPT

## 2019-11-05 PROCEDURE — 700111 HCHG RX REV CODE 636 W/ 250 OVERRIDE (IP): Performed by: EMERGENCY MEDICINE

## 2019-11-05 PROCEDURE — 96375 TX/PRO/DX INJ NEW DRUG ADDON: CPT

## 2019-11-05 PROCEDURE — 73610 X-RAY EXAM OF ANKLE: CPT | Mod: LT

## 2019-11-05 PROCEDURE — 99285 EMERGENCY DEPT VISIT HI MDM: CPT

## 2019-11-05 PROCEDURE — 73620 X-RAY EXAM OF FOOT: CPT | Mod: LT

## 2019-11-05 RX ORDER — OXYCODONE HYDROCHLORIDE 5 MG/1
5 TABLET ORAL EVERY 4 HOURS PRN
Qty: 10 TAB | Refills: 0 | Status: SHIPPED | OUTPATIENT
Start: 2019-11-05 | End: 2019-11-08

## 2019-11-05 RX ORDER — HYDROMORPHONE HYDROCHLORIDE 1 MG/ML
0.5 INJECTION, SOLUTION INTRAMUSCULAR; INTRAVENOUS; SUBCUTANEOUS
Status: DISCONTINUED | OUTPATIENT
Start: 2019-11-05 | End: 2019-11-05 | Stop reason: HOSPADM

## 2019-11-05 RX ORDER — ONDANSETRON 2 MG/ML
4 INJECTION INTRAMUSCULAR; INTRAVENOUS ONCE
Status: COMPLETED | OUTPATIENT
Start: 2019-11-05 | End: 2019-11-05

## 2019-11-05 RX ADMIN — ONDANSETRON 4 MG: 2 INJECTION INTRAMUSCULAR; INTRAVENOUS at 16:10

## 2019-11-05 RX ADMIN — HYDROMORPHONE HYDROCHLORIDE 0.5 MG: 1 INJECTION, SOLUTION INTRAMUSCULAR; INTRAVENOUS; SUBCUTANEOUS at 16:10

## 2019-11-05 ASSESSMENT — LIFESTYLE VARIABLES: DO YOU DRINK ALCOHOL: NO

## 2019-11-05 NOTE — ED TRIAGE NOTES
Per EMS - pt suffered trip and fall down a grass hill. Pt only c/o L ankle pain s/p fall. EMS reported swelling to ankle but no obvious deformity, +PMS. EMS placed splint and gave 100mcg fentanyl IVP pta.

## 2019-11-05 NOTE — ED PROVIDER NOTES
ED Provider Note    Scribed for Afshin Shepherd M.D. by Smith Fierro. 2019  3:52 PM    Primary care provider: Elise Garcia M.D.  Means of arrival: EMS  History obtained from: Patient  History limited by: None    CHIEF COMPLAINT  Chief Complaint   Patient presents with   • Ankle Injury       HPI  Gerald Stein is a 44 y.o. female who presents to the Emergency Department for evaluation of left ankle pain following a ground level fall. She states that she tripped and fell down a grassy hill. She notes associated swelling to the affected area. The patient has no fever. EMS noted no obvious deformity and gave her 100mcg of fentanyl en route. The patient states that she has a history of colitis. She denies taking drugs or alcohol. She denies loss of consciousness.     REVIEW OF SYSTEMS  Pertinent positives include left ankle pain and left ankle swelling. Pertinent negatives include no fever.  PAST MEDICAL HISTORY   has a past medical history of Head ache, Obesity, and Ulcerative colitis (HCC).    SURGICAL HISTORY   has a past surgical history that includes tubal coagulation laparoscopic bilateral and tube & ectopic preg., removal.    SOCIAL HISTORY  Social History     Tobacco Use   • Smoking status: Former Smoker     Packs/day: 1.00     Years: 10.00     Pack years: 10.00     Types: Cigarettes     Last attempt to quit: 2000     Years since quittin.7   • Smokeless tobacco: Never Used   Substance Use Topics   • Alcohol use: Yes     Alcohol/week: 3.6 oz     Types: 6 Cans of beer per week   • Drug use: No      Social History     Substance and Sexual Activity   Drug Use No       FAMILY HISTORY  Family History   Problem Relation Age of Onset   • Heart Disease Father    • Cancer Father 65        brain   • Cancer Sister         eye lid, skin   • Diabetes Neg Hx    • Stroke Neg Hx        CURRENT MEDICATIONS  Home Medications    **Home medications have not yet been reviewed for this encounter**    "      ALLERGIES  Allergies   Allergen Reactions   • Morphine Vomiting   • Flagyl [Kdc:Metronidazole+Tartrazine] Rash     Rash         PHYSICAL EXAM  VITAL SIGNS: /65   Pulse 88   Temp 36.9 °C (98.4 °F) (Temporal)   Resp 20   Ht 1.676 m (5' 6\")   Wt 99.8 kg (220 lb)   BMI 35.51 kg/m²     Constitutional: Well developed, Well nourished, moderate distress, Non-toxic appearance.   HENT: Normocephalic, Atraumatic, Bilateral external ears normal, Oropharynx moist, No oral exudates.   Eyes: PERRLA, EOMI, Conjunctiva normal, No discharge.   Neck: No Cspine tenderness, No tenderness, Supple, No stridor.   Cardiovascular: Normal heart rate, Normal rhythm.   Thorax & Lungs: Clear to auscultation bilaterally, No respiratory distress, No wheezing, No crackles.   Abdomen: Soft, No tenderness, No masses, No pulsatile masses.   Skin: Warm, Dry, No erythema, No rash.   Extremities:, Diffuse tenderness throughout left ankle and dorsal aspect of left foot, No edema No cyanosis.   Musculoskeletal: No tenderness to palpation or major deformities noted.  Intact distal pulses  Neurologic: Awake, alert. Moves all extremities spontaneously.  Psychiatric: Affect normal, Judgment normal, Mood normal.     RADIOLOGY  DX-ANKLE 3+ VIEWS LEFT   Final Result      1.  There is diffuse left ankle swelling without displaced fracture.      DX-FOOT-2- LEFT   Final Result      No evidence of fracture or dislocation.        The radiologist's interpretation of all radiological studies have been reviewed by me.      COURSE & MEDICAL DECISION MAKING  Pertinent Labs & Imaging studies reviewed. (See chart for details)    3:52 PM - Patient seen and examined at bedside. We discussed the plan of care that includes pain medication and evaluation with xrays. Patient will be treated with Zofran injection 4mg and Dilaudid injection 0.5mg. Ordered DX ankle left and DX foot left to evaluate her symptoms.  Decision Making:  Patient with ankle injury came by " ambulance, x-rays negative, will give the patient a prescription for oxycodone, crutches, air splint, have the patient follow-up with Dr. Gil as an outpatient, have the patient return with worsening symptoms.    I reviewed prescription monitoring program for patient's narcotic use before prescribing a scheduled drug.The patient will not drink alcohol nor drive with prescribed medications.} The patient will return for new or worsening symptoms and is stable at the time of discharge.    The patient is referred to a primary physician for blood pressure management, diabetic screening, and for all other preventative health concerns.    In prescribing controlled substances to this patient, I certify that I have obtained and reviewed the medical history of Gerald Stein. I have also made a good lida effort to obtain applicable records from other providers who have treated the patient and records did not demonstrate any increased risk of substance abuse that would prevent me from prescribing controlled substances.     I have conducted a physical exam and documented it. I have reviewed Ms. Stein’s prescription history as maintained by the [place] Prescription Monitoring Program.     I have assessed the patient’s risk for abuse, dependency, and addiction using the validated Opioid Risk Tool available at https://www.mdcalc.com/upbwzd-kekz-jfst-ort-narcotic-abuse.     Given the above, I believe the benefits of controlled substance therapy outweigh the risks. The reasons for prescribing controlled substances include non-narcotic, oral analgesic alternatives have been inadequate for pain control. Accordingly, I have discussed the risk and benefits, treatment plan, and alternative therapies with the patient.         DISPOSITION:  Patient will be discharged home in stable condition.    FOLLOW UP:  [facility], Emergency Dept  [address] [facility] [address] [phone]    If symptoms  worsen    Pascual Gil M.D.  [address] [place] [address]  [phone]            OUTPATIENT MEDICATIONS:  New Prescriptions    OXYCODONE IMMEDIATE-RELEASE (ROXICODONE) 5 MG TAB    Take 1 Tab by mouth every four hours as needed for Severe Pain for up to 3 days.         FINAL IMPRESSION  1. Sprain of left ankle, unspecified ligament, initial encounter          ISmith (Scribe), am scribing for, and in the presence of, Afshin Shepherd M.D..    Electronically signed by: Smith Fierro (Wayneibermias), 11/5/2019    IAfshin M.D. personally performed the services described in this documentation, as scribed by Smith Fierro in my presence, and it is both accurate and complete. E    The note accurately reflects work and decisions made by me.  Afshin Shepherd  11/5/2019  5:26 PM

## 2020-01-09 ENCOUNTER — OFFICE VISIT (OUTPATIENT)
Dept: MEDICAL GROUP | Facility: MEDICAL CENTER | Age: 45
End: 2020-01-09
Attending: INTERNAL MEDICINE
Payer: MEDICAID

## 2020-01-09 VITALS
TEMPERATURE: 97.9 F | SYSTOLIC BLOOD PRESSURE: 118 MMHG | WEIGHT: 222 LBS | BODY MASS INDEX: 34.84 KG/M2 | RESPIRATION RATE: 16 BRPM | OXYGEN SATURATION: 97 % | DIASTOLIC BLOOD PRESSURE: 72 MMHG | HEIGHT: 67 IN | HEART RATE: 82 BPM

## 2020-01-09 DIAGNOSIS — R07.89 LEFT-SIDED CHEST WALL PAIN: ICD-10-CM

## 2020-01-09 DIAGNOSIS — M25.572 CHRONIC PAIN OF LEFT ANKLE: ICD-10-CM

## 2020-01-09 DIAGNOSIS — E66.9 OBESITY (BMI 30-39.9): ICD-10-CM

## 2020-01-09 DIAGNOSIS — G89.29 CHRONIC PAIN OF LEFT ANKLE: ICD-10-CM

## 2020-01-09 PROBLEM — R05.9 COUGH: Status: RESOLVED | Noted: 2019-07-16 | Resolved: 2020-01-09

## 2020-01-09 PROCEDURE — 99214 OFFICE O/P EST MOD 30 MIN: CPT | Performed by: INTERNAL MEDICINE

## 2020-01-09 PROCEDURE — 99212 OFFICE O/P EST SF 10 MIN: CPT | Performed by: INTERNAL MEDICINE

## 2020-01-09 ASSESSMENT — PAIN SCALES - GENERAL: PAINLEVEL: 4=SLIGHT-MODERATE PAIN

## 2020-01-10 NOTE — PROGRESS NOTES
Subjective:   Gerald Stein is a 44 y.o. female here today for left chest wall pain, left ankle pain, weight loss counseling    Left-sided chest wall pain  She reports about a week ago doing some arm exercises at the gym and experiencing sudden onset of left-sided pectoral pain.  She denies any overlying bruising however she immediately stopped the exercise.  She felt a little bit better the next day, but has been sore in the area ever since.  She is also been having some pain with deep breathing in that area.  She has been taking ibuprofen recently which has been very helpful.     Left ankle pain  She complains of continued left ankle pain.  States that she sustained a bad sprain of the left ankle where her toes got stuck in a small hole and her weight went forward causing extreme dorsiflexion of the foot.  She states immediately she had severe swelling.  She went to the emergency room where an ankle x-ray was completed which was negative for fracture.  She was placed in a Aircast which she states she wore consistently for several weeks.  She is still having some pain in the ankle which is worse with standing and walking.  She reports decreased internal rotation of the ankle and still little bit of swelling.  She has been wearing an Ace wrap but no other type of brace.  Also states that she has been doing some resisted ankle flexion weights at the gym.      Obesity (BMI 30-39.9)  She has been working on weight loss lately.  Reports modifying her diet significantly.  2 weeks ago, stopped all sodas and has cut back significantly on desserts.  Has been eating small portions of carbs and increased her lean protein and vegetable intake.  Has not noticed any weight loss yet.  Has been going to the gym more frequently.       Current medicines (including changes today)  Current Outpatient Medications   Medication Sig Dispense Refill   • meloxicam (MOBIC) 7.5 MG Tab Take 1 Tab by mouth 1 time daily as needed for  Moderate Pain. 30 Tab 3   • Mesalamine 800 MG Tablet Delayed Response TAKE 1 CAP BY MOUTH 3 TIMES A DAY 90 Tab 5   • loratadine (CLARITIN) 10 MG Tab Take 1 Tab by mouth every day. 30 Tab    • fluticasone (FLONASE) 50 MCG/ACT nasal spray Spray 2 Sprays in nose every day. 16 g 11   • sumatriptan (IMITREX) 100 MG tablet TAKE 1/2 TO 1 FULL TAB AS NEEDED AT FIRST SIGN OF MIGRAINE HEADACHE 9 Tab 3   • mesalamine (ROWASA) 4 GM Enema Insert 1 enema at night 30 Enema 1     No current facility-administered medications for this visit.      She  has a past medical history of Head ache, Obesity, and Ulcerative colitis (HCC).    ROS   Denies shortness of breath  As above in HPI     Objective:     Vitals:    01/09/20 1607   BP: 118/72   Pulse: 82   Resp: 16   Temp: 36.6 °C (97.9 °F)   SpO2: 97%     Body mass index is 34.76 kg/m².   Physical Exam:  Constitutional: Alert, no distress.  Skin: Warm, dry, good turgor, no rashes in visible areas.  Eye: Equal, round and reactive, conjunctiva clear, lids normal..  Psych: Alert and oriented x3, normal affect and mood.  MSK: Tender to palpation over left pectoralis muscle, tender to palpation over lateral aspect of left ankle with some mild swelling and reduced internal rotation      Assessment and Plan:   The following treatment plan was discussed    1. Left-sided chest wall pain  Secondary to pulled muscle, likely pectoralis.  We discussed resting the area, continuing ibuprofen as needed for the next week or so.  Expect full resolution of symptoms by then    2. Chronic pain of left ankle  After sprain.  I suspect she has reinjured it with some of the exercises she is doing at the gym and we discussed not doing any resisted flexion or extension exercises until she is pain-free while walking.  We also discussed using a more supportive lace up style or Velcro style ankle brace when she knows she will be standing for long periods or when she is at work.  She can wear this for the next several  weeks and slowly wean herself off of it    3. Obesity (BMI 30-39.9)  She has made some significant dietary changes.  We also discussed portion control but I positively reinforced the good choices she has been making.  We will continue to monitor.        Followup: Return if symptoms worsen or fail to improve.

## 2020-01-10 NOTE — ASSESSMENT & PLAN NOTE
She reports about a week ago doing some arm exercises at the gym and experiencing sudden onset of left-sided pectoral pain.  She denies any overlying bruising however she immediately stopped the exercise.  She felt a little bit better the next day, but has been sore in the area ever since.  She is also been having some pain with deep breathing in that area.  She has been taking ibuprofen recently which has been very helpful.

## 2020-01-10 NOTE — ASSESSMENT & PLAN NOTE
She has been working on weight loss lately.  Reports modifying her diet significantly.  2 weeks ago, stopped all sodas and has cut back significantly on desserts.  Has been eating small portions of carbs and increased her lean protein and vegetable intake.  Has not noticed any weight loss yet.  Has been going to the gym more frequently.

## 2020-01-10 NOTE — ASSESSMENT & PLAN NOTE
She complains of continued left ankle pain.  States that she sustained a bad sprain of the left ankle where her toes got stuck in a small hole and her weight went forward causing extreme dorsiflexion of the foot.  She states immediately she had severe swelling.  She went to the emergency room where an ankle x-ray was completed which was negative for fracture.  She was placed in a Aircast which she states she wore consistently for several weeks.  She is still having some pain in the ankle which is worse with standing and walking.  She reports decreased internal rotation of the ankle and still little bit of swelling.  She has been wearing an Ace wrap but no other type of brace.  Also states that she has been doing some resisted ankle flexion weights at the gym.

## 2021-03-29 ENCOUNTER — OFFICE VISIT (OUTPATIENT)
Dept: MEDICAL GROUP | Facility: IMAGING CENTER | Age: 46
End: 2021-03-29
Payer: COMMERCIAL

## 2021-03-29 ENCOUNTER — HOSPITAL ENCOUNTER (OUTPATIENT)
Dept: LAB | Facility: MEDICAL CENTER | Age: 46
End: 2021-03-29
Attending: PHYSICIAN ASSISTANT
Payer: COMMERCIAL

## 2021-03-29 VITALS
HEART RATE: 68 BPM | TEMPERATURE: 98.6 F | HEIGHT: 67 IN | RESPIRATION RATE: 14 BRPM | BODY MASS INDEX: 34 KG/M2 | SYSTOLIC BLOOD PRESSURE: 116 MMHG | OXYGEN SATURATION: 98 % | DIASTOLIC BLOOD PRESSURE: 72 MMHG | WEIGHT: 216.6 LBS

## 2021-03-29 DIAGNOSIS — Z13.220 SCREENING CHOLESTEROL LEVEL: ICD-10-CM

## 2021-03-29 DIAGNOSIS — G89.29 CHRONIC PAIN OF BOTH ANKLES: ICD-10-CM

## 2021-03-29 DIAGNOSIS — M79.672 PAIN IN BOTH FEET: ICD-10-CM

## 2021-03-29 DIAGNOSIS — Z13.1 DIABETES MELLITUS SCREENING: ICD-10-CM

## 2021-03-29 DIAGNOSIS — M25.572 CHRONIC PAIN OF BOTH ANKLES: ICD-10-CM

## 2021-03-29 DIAGNOSIS — R53.83 FATIGUE, UNSPECIFIED TYPE: ICD-10-CM

## 2021-03-29 DIAGNOSIS — K51.919 ULCERATIVE COLITIS WITH COMPLICATION, UNSPECIFIED LOCATION (HCC): ICD-10-CM

## 2021-03-29 DIAGNOSIS — M79.671 PAIN IN BOTH FEET: ICD-10-CM

## 2021-03-29 DIAGNOSIS — E53.8 B12 DEFICIENCY: ICD-10-CM

## 2021-03-29 DIAGNOSIS — M25.571 CHRONIC PAIN OF BOTH ANKLES: ICD-10-CM

## 2021-03-29 DIAGNOSIS — G62.9 NEUROPATHY: ICD-10-CM

## 2021-03-29 DIAGNOSIS — Z76.89 ENCOUNTER TO ESTABLISH CARE: ICD-10-CM

## 2021-03-29 DIAGNOSIS — Z12.31 ENCOUNTER FOR SCREENING MAMMOGRAM FOR BREAST CANCER: ICD-10-CM

## 2021-03-29 PROBLEM — R41.840 DIFFICULTY CONCENTRATING: Status: RESOLVED | Noted: 2019-03-15 | Resolved: 2021-03-29

## 2021-03-29 PROBLEM — F81.0 IMPAIRED READING COMPREHENSION: Status: RESOLVED | Noted: 2018-09-13 | Resolved: 2021-03-29

## 2021-03-29 PROBLEM — F81.9 LEARNING DISABILITY: Status: RESOLVED | Noted: 2018-09-13 | Resolved: 2021-03-29

## 2021-03-29 PROBLEM — R07.89 LEFT-SIDED CHEST WALL PAIN: Status: RESOLVED | Noted: 2020-01-09 | Resolved: 2021-03-29

## 2021-03-29 LAB
25(OH)D3 SERPL-MCNC: 26 NG/ML (ref 30–100)
ALBUMIN SERPL BCP-MCNC: 4.3 G/DL (ref 3.2–4.9)
ALBUMIN/GLOB SERPL: 1.4 G/DL
ALP SERPL-CCNC: 85 U/L (ref 30–99)
ALT SERPL-CCNC: 50 U/L (ref 2–50)
ANION GAP SERPL CALC-SCNC: 8 MMOL/L (ref 7–16)
AST SERPL-CCNC: 31 U/L (ref 12–45)
BASOPHILS # BLD AUTO: 1.4 % (ref 0–1.8)
BASOPHILS # BLD: 0.11 K/UL (ref 0–0.12)
BILIRUB SERPL-MCNC: 1 MG/DL (ref 0.1–1.5)
BUN SERPL-MCNC: 15 MG/DL (ref 8–22)
CALCIUM SERPL-MCNC: 9.2 MG/DL (ref 8.5–10.5)
CHLORIDE SERPL-SCNC: 105 MMOL/L (ref 96–112)
CHOLEST SERPL-MCNC: 182 MG/DL (ref 100–199)
CO2 SERPL-SCNC: 25 MMOL/L (ref 20–33)
CREAT SERPL-MCNC: 0.74 MG/DL (ref 0.5–1.4)
CRP SERPL HS-MCNC: <0.3 MG/DL (ref 0–0.75)
EOSINOPHIL # BLD AUTO: 0.35 K/UL (ref 0–0.51)
EOSINOPHIL NFR BLD: 4.4 % (ref 0–6.9)
ERYTHROCYTE [DISTWIDTH] IN BLOOD BY AUTOMATED COUNT: 44.2 FL (ref 35.9–50)
ERYTHROCYTE [SEDIMENTATION RATE] IN BLOOD BY WESTERGREN METHOD: 3 MM/HOUR (ref 0–20)
EST. AVERAGE GLUCOSE BLD GHB EST-MCNC: 94 MG/DL
FASTING STATUS PATIENT QL REPORTED: NORMAL
FERRITIN SERPL-MCNC: 68.9 NG/ML (ref 10–291)
GLOBULIN SER CALC-MCNC: 3.1 G/DL (ref 1.9–3.5)
GLUCOSE SERPL-MCNC: 93 MG/DL (ref 65–99)
HBA1C MFR BLD: 4.9 % (ref 4–5.6)
HCT VFR BLD AUTO: 44.9 % (ref 37–47)
HDLC SERPL-MCNC: 31 MG/DL
HGB BLD-MCNC: 15.3 G/DL (ref 12–16)
IMM GRANULOCYTES # BLD AUTO: 0.04 K/UL (ref 0–0.11)
IMM GRANULOCYTES NFR BLD AUTO: 0.5 % (ref 0–0.9)
IRON SATN MFR SERPL: 29 % (ref 15–55)
IRON SERPL-MCNC: 81 UG/DL (ref 40–170)
LDLC SERPL CALC-MCNC: 102 MG/DL
LYMPHOCYTES # BLD AUTO: 3.32 K/UL (ref 1–4.8)
LYMPHOCYTES NFR BLD: 41.4 % (ref 22–41)
MCH RBC QN AUTO: 31.3 PG (ref 27–33)
MCHC RBC AUTO-ENTMCNC: 34.1 G/DL (ref 33.6–35)
MCV RBC AUTO: 91.8 FL (ref 81.4–97.8)
MONOCYTES # BLD AUTO: 0.69 K/UL (ref 0–0.85)
MONOCYTES NFR BLD AUTO: 8.6 % (ref 0–13.4)
NEUTROPHILS # BLD AUTO: 3.5 K/UL (ref 2–7.15)
NEUTROPHILS NFR BLD: 43.7 % (ref 44–72)
NRBC # BLD AUTO: 0 K/UL
NRBC BLD-RTO: 0 /100 WBC
PLATELET # BLD AUTO: 176 K/UL (ref 164–446)
PMV BLD AUTO: 11.4 FL (ref 9–12.9)
POTASSIUM SERPL-SCNC: 3.7 MMOL/L (ref 3.6–5.5)
PROT SERPL-MCNC: 7.4 G/DL (ref 6–8.2)
RBC # BLD AUTO: 4.89 M/UL (ref 4.2–5.4)
RHEUMATOID FACT SER IA-ACNC: <10 IU/ML (ref 0–14)
SODIUM SERPL-SCNC: 138 MMOL/L (ref 135–145)
TIBC SERPL-MCNC: 280 UG/DL (ref 250–450)
TRIGL SERPL-MCNC: 247 MG/DL (ref 0–149)
TSH SERPL DL<=0.005 MIU/L-ACNC: 1.72 UIU/ML (ref 0.38–5.33)
UIBC SERPL-MCNC: 199 UG/DL (ref 110–370)
VIT B12 SERPL-MCNC: 480 PG/ML (ref 211–911)
WBC # BLD AUTO: 8 K/UL (ref 4.8–10.8)

## 2021-03-29 PROCEDURE — 84443 ASSAY THYROID STIM HORMONE: CPT

## 2021-03-29 PROCEDURE — 80053 COMPREHEN METABOLIC PANEL: CPT

## 2021-03-29 PROCEDURE — 83540 ASSAY OF IRON: CPT

## 2021-03-29 PROCEDURE — 82728 ASSAY OF FERRITIN: CPT

## 2021-03-29 PROCEDURE — 83036 HEMOGLOBIN GLYCOSYLATED A1C: CPT

## 2021-03-29 PROCEDURE — 80061 LIPID PANEL: CPT

## 2021-03-29 PROCEDURE — 83550 IRON BINDING TEST: CPT

## 2021-03-29 PROCEDURE — 85025 COMPLETE CBC W/AUTO DIFF WBC: CPT

## 2021-03-29 PROCEDURE — 85652 RBC SED RATE AUTOMATED: CPT

## 2021-03-29 PROCEDURE — 86140 C-REACTIVE PROTEIN: CPT

## 2021-03-29 PROCEDURE — 86200 CCP ANTIBODY: CPT

## 2021-03-29 PROCEDURE — 86431 RHEUMATOID FACTOR QUANT: CPT

## 2021-03-29 PROCEDURE — 99203 OFFICE O/P NEW LOW 30 MIN: CPT | Performed by: PHYSICIAN ASSISTANT

## 2021-03-29 PROCEDURE — 86038 ANTINUCLEAR ANTIBODIES: CPT

## 2021-03-29 PROCEDURE — 36415 COLL VENOUS BLD VENIPUNCTURE: CPT

## 2021-03-29 PROCEDURE — 82607 VITAMIN B-12: CPT

## 2021-03-29 PROCEDURE — 82306 VITAMIN D 25 HYDROXY: CPT

## 2021-03-29 ASSESSMENT — PATIENT HEALTH QUESTIONNAIRE - PHQ9
SUM OF ALL RESPONSES TO PHQ QUESTIONS 1-9: 12
CLINICAL INTERPRETATION OF PHQ2 SCORE: 2
5. POOR APPETITE OR OVEREATING: 1 - SEVERAL DAYS

## 2021-03-29 ASSESSMENT — PAIN SCALES - GENERAL: PAINLEVEL: 5=MODERATE PAIN

## 2021-03-29 NOTE — PROGRESS NOTES
Subjective:     CC:    Chief Complaint   Patient presents with   • Establish Care   • Foot Pain     bilateral, x 3 years, hx of sprained ankles    • Headache     chronic, off and on, changes constantly        HISTORY OF THE PRESENT ILLNESS: Patient is a 45 y.o. female.     Ulcerative colitis (CMS-HCC) (HCC)  No recent flares, improved since going gluten free. Last colonoscopy 8/19.    Pain in both feet  Pt admits to chronic foot pain. Right is worse than left. She is also having a numbness in her toes, most specifically her right. She has associated ankle swelling. Pain is worse in the morning right when she steps out of bed, and persists throughout the day. Has had PT in the past which helped slightly. She still does it at home and also wears orthotics.       Depression Screening    Little interest or pleasure in doing things?  1 - several days   Feeling down, depressed , or hopeless? 1 - several days   Trouble falling or staying asleep, or sleeping too much?  3 - nearly every day   Feeling tired or having little energy?  2 - more than half the days   Poor appetite or overeating?  1 - several days   Feeling bad about yourself - or that you are a failure or have let yourself or your family down? 1 - several days   Trouble concentrating on things, such as reading the newspaper or watching television? 2 - more than half the days   Moving or speaking so slowly that other people could have noticed.  Or the opposite - being so fidgety or restless that you have been moving around a lot more than usual?  1 - several days   Thoughts that you would be better off dead, or of hurting yourself?  0 - not at all   Patient Health Questionnaire Score: 12       Allergies:   Morphine and Flagyl [kdc:metronidazole+tartrazine]  Current Outpatient Medications Ordered in Epic   Medication Sig Dispense Refill   • Acetaminophen (TYLENOL PO) Take  by mouth.     • IBUPROFEN PO Take  by mouth.     • loratadine (CLARITIN) 10 MG Tab Take 1 Tab  by mouth every day. 30 Tab      No current Epic-ordered facility-administered medications on file.     Past Medical History:   Diagnosis Date   • Difficulty concentrating 3/15/2019   • Head ache    • Impaired reading comprehension 2018   • Learning disability 2018   • Left ankle pain 2020   • Left-sided chest wall pain 2020   • Obesity    • Plantar fasciitis    • Ulcerative colitis (HCC)      Past Surgical History:   Procedure Laterality Date   • TUBAL COAGULATION LAPAROSCOPIC BILATERAL     • TUBE & ECTOPIC PREG., REMOVAL       Social History     Tobacco Use   • Smoking status: Former Smoker     Packs/day: 1.00     Years: 10.00     Pack years: 10.00     Types: Cigarettes     Quit date: 2000     Years since quittin.1   • Smokeless tobacco: Never Used   Substance Use Topics   • Alcohol use: Yes     Alcohol/week: 3.6 oz     Types: 6 Cans of beer per week     Comment: occasionally    • Drug use: No     Social History     Social History Narrative   • Not on file     Family History   Problem Relation Age of Onset   • Heart Disease Father    • Cancer Father 65        brain   • Cancer Sister         eye lid, skin   • Diabetes Neg Hx    • Stroke Neg Hx      Health Maintenance:  Diet: pt has reduced gluten intake due to UC, semi-keto diet   Exercise: 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, no routine exercise  Immunizations:    Influenza: UTD    Tetanus: UTD  Mammo: never  Pap:   Cscope -        ROS:   Gen: no fevers/chills, no changes in weight  Eyes: no changes in vision  ENT: no sore throat, no hearing loss, no bloody nose  Pulm: no sob, no cough  CV: no chest pain, no palpitations  GI: no nausea/vomiting, no diarrhea  : no dysuria or hematuria  MSk: no myalgias  Skin: no rash  Neuro: no headaches, no numbness/tingling  Heme/Lymph: no easy bruising      Objective:     Exam: /72 (BP Location: Left arm, Patient Position: Sitting, BP Cuff Size: Adult)   Pulse 68   Temp 37 °C (98.6  "°F) (Temporal)   Resp 14   Ht 1.702 m (5' 7\")   Wt 98.2 kg (216 lb 9.6 oz)   SpO2 98%  Body mass index is 33.92 kg/m².  General: Normal appearing. No distress.  HEENT: Normocephalic. Eyes conjunctiva clear lids without ptosis, pupils equal  Neck: Supple without JVD or bruit. Thyroid is not enlarged.  Pulmonary: Clear to ausculation.  Normal effort. No rales, ronchi, or wheezing.  Cardiovascular: Regular rate and rhythm without murmur. Carotid and radial pulses are intact and equal bilaterally.  Abdomen: Soft, nontender, nondistended. Normal bowel sounds. Liver and spleen are not palpable  Neurologic: Grossly nonfocal  Lymph: No cervical or supraclavicular lymph nodes are palpable  Skin: Warm and dry.  No obvious lesions.  Musculoskeletal: Normal gait. No extremity cyanosis, clubbing, or edema.  Bilateral ankles with nonpitting edema.  Slight decrease sensation bilateral first and second toes.  Full range of motion.  Nontender to palpation.  Psych: Normal mood and affect. Alert and oriented x3. Judgment and insight is normal.    Assessment & Plan:   45 y.o. female with the following -    1. Encounter to establish care  45-year-old female here to establish care.  Ordered mammogram.  Labs reviewed.    2. B12 deficiency  Check updated level  - VITAMIN B12; Future    3. Fatigue, unspecified type  See lab orders below, likely multifactorial.  - VITAMIN B12; Future  - CBC WITH DIFFERENTIAL; Future  - Comp Metabolic Panel; Future  - VITAMIN D,25 HYDROXY; Future  - FERRITIN; Future  - IRON/TOTAL IRON BIND; Future  - TSH WITH REFLEX TO FT4; Future    4. Pain in both feet  Check updated imaging, rule out autoimmune/rheumatologic etiology.  - REFERRAL TO PODIATRY  - Sed Rate; Future  - NICOLE REFLEXIVE PROFILE; Future  - CRP QUANTITIVE (NON-CARDIAC); Future  - RHEUMATOID FACTOR QUANT; Future  - CCP; Future  - DX-FOOT-COMPLETE 3+ LEFT; Future  - DX-FOOT-COMPLETE 3+ RIGHT; Future    5. Ulcerative colitis with complication, " unspecified location (HCC)  Chronic, stable per patient.  Check sed rate and CRP.  Colonoscopy up-to-date.  - Sed Rate; Future  - CRP QUANTITIVE (NON-CARDIAC); Future    6. Encounter for screening mammogram for breast cancer  - MA-SCREENING MAMMO BILAT W/TOMOSYNTHESIS W/CAD; Future    7. Neuropathy  May be related to B12 deficiency as patient's levels were borderline low 2 years ago.  Consider future Neurontin.  Patient follow-up in 2 weeks.  - VITAMIN B12; Future  - NICOLE REFLEXIVE PROFILE; Future  - RHEUMATOID FACTOR QUANT; Future  - CCP; Future  - HEMOGLOBIN A1C; Future    8. Screening cholesterol level  - Lipid Profile; Future    9. Diabetes mellitus screening  - HEMOGLOBIN A1C; Future    10. Chronic pain of both ankles  Check updated imaging.  - DX-ANKLE 3+ VIEWS RIGHT; Future  - DX-ANKLE 3+ VIEWS LEFT; Future      Return in about 2 weeks (around 4/12/2021) for Follow-up, get labs drawn one week prior to appointment.    Pap smear later this year    Madonna Jain PA-C    Please note that this dictation was created using voice recognition software. I have made every reasonable attempt to correct obvious errors, but I expect that there are errors of grammar and possibly content that I did not discover before finalizing the note.

## 2021-03-29 NOTE — ASSESSMENT & PLAN NOTE
Pt admits to chronic foot pain. Right is worse than left. She is also having a numbness in her toes, most specifically her right. She has associated ankle swelling. Pain is worse in the morning right when she steps out of bed, and persists throughout the day. Has had PT in the past which helped slightly. She still does it at home and also wears orthotics.

## 2021-03-29 NOTE — PATIENT INSTRUCTIONS
Once resulted, your lab/test/imaging results will show up automatically in your MyChart. Please wait for my interpretation and recommendations prior to viewing your results to avoid any unnecessary confusion or misinterpretation. I will address all of the lab values that I interpret as abnormal and message you accordingly on your MyChart. I will always send you a message on your labs even if they are normal. If you do not hear back from me within 5 business days after getting your labs drawn, then please send me a message on [facility] so I can obtain your labs (especially if you went to an outside lab - [facility], [facility], etc). If you have any additional questions or concerns beyond my interpretation of your results, please make an appointment with me to discuss in further detail.    Please only use the [employer] messaging system for questions regarding your most recent appointment or if advised to use otherwise (glucose or blood pressure reporting). If you have any new problems or concerns, you must make an appointment to discuss. This includes any referral requests.     Thank you,    Madonna Monet PA-C

## 2021-03-30 ENCOUNTER — HOSPITAL ENCOUNTER (OUTPATIENT)
Dept: RADIOLOGY | Facility: MEDICAL CENTER | Age: 46
End: 2021-03-30
Attending: PHYSICIAN ASSISTANT
Payer: COMMERCIAL

## 2021-03-30 DIAGNOSIS — M25.572 CHRONIC PAIN OF BOTH ANKLES: ICD-10-CM

## 2021-03-30 DIAGNOSIS — M79.672 PAIN IN BOTH FEET: ICD-10-CM

## 2021-03-30 DIAGNOSIS — G89.29 CHRONIC PAIN OF BOTH ANKLES: ICD-10-CM

## 2021-03-30 DIAGNOSIS — M79.671 PAIN IN BOTH FEET: ICD-10-CM

## 2021-03-30 DIAGNOSIS — M25.571 CHRONIC PAIN OF BOTH ANKLES: ICD-10-CM

## 2021-03-30 LAB — NUCLEAR IGG SER QL IA: NORMAL

## 2021-03-30 PROCEDURE — 73610 X-RAY EXAM OF ANKLE: CPT | Mod: LT

## 2021-03-30 PROCEDURE — 73630 X-RAY EXAM OF FOOT: CPT | Mod: RT

## 2021-03-30 PROCEDURE — 73610 X-RAY EXAM OF ANKLE: CPT | Mod: RT

## 2021-03-30 PROCEDURE — 73630 X-RAY EXAM OF FOOT: CPT | Mod: LT

## 2021-03-31 LAB — CCP IGG SERPL-ACNC: 3 UNITS (ref 0–19)

## 2021-04-12 ENCOUNTER — OFFICE VISIT (OUTPATIENT)
Dept: MEDICAL GROUP | Facility: IMAGING CENTER | Age: 46
End: 2021-04-12
Payer: COMMERCIAL

## 2021-04-12 VITALS
SYSTOLIC BLOOD PRESSURE: 100 MMHG | RESPIRATION RATE: 14 BRPM | BODY MASS INDEX: 33.9 KG/M2 | WEIGHT: 216 LBS | TEMPERATURE: 98 F | HEART RATE: 67 BPM | DIASTOLIC BLOOD PRESSURE: 62 MMHG | OXYGEN SATURATION: 98 % | HEIGHT: 67 IN

## 2021-04-12 DIAGNOSIS — M77.30 CALCANEAL SPUR, UNSPECIFIED LATERALITY: ICD-10-CM

## 2021-04-12 DIAGNOSIS — E78.1 HYPERTRIGLYCERIDEMIA: ICD-10-CM

## 2021-04-12 DIAGNOSIS — M79.672 PAIN IN BOTH FEET: ICD-10-CM

## 2021-04-12 DIAGNOSIS — E55.9 VITAMIN D DEFICIENCY: ICD-10-CM

## 2021-04-12 DIAGNOSIS — M79.671 PAIN IN BOTH FEET: ICD-10-CM

## 2021-04-12 PROCEDURE — 99214 OFFICE O/P EST MOD 30 MIN: CPT | Performed by: PHYSICIAN ASSISTANT

## 2021-04-12 RX ORDER — CHLORAL HYDRATE 500 MG
1000 CAPSULE ORAL
COMMUNITY

## 2021-04-12 ASSESSMENT — PAIN SCALES - GENERAL: PAINLEVEL: 5=MODERATE PAIN

## 2021-04-12 ASSESSMENT — FIBROSIS 4 INDEX: FIB4 SCORE: 1.1

## 2021-04-12 NOTE — ASSESSMENT & PLAN NOTE
Noted B/L calcaneal heel spurs on xray imaging. Pt had a brace that she wears on both feet at work and has changed her shoes. She has specialty inserts. She admits to burning and numbness in her toes R>L. She has most of her pain in the left foot though.

## 2021-04-12 NOTE — PATIENT INSTRUCTIONS
Capsaicin cream 2-3 times a day for your feet    Mediterranean Diet  A Mediterranean diet refers to food and lifestyle choices that are based on the traditions of countries located on the [place]. This way of eating has been shown to help prevent certain conditions and improve outcomes for people who have chronic diseases, like kidney disease and heart disease.  What are tips for following this plan?  Lifestyle  · Cook and eat meals together with your family, when possible.  · Drink enough fluid to keep your urine clear or pale yellow.  · Be physically active every day. This includes:  ? Aerobic exercise like running or swimming.  ? Leisure activities like gardening, walking, or housework.  · Get 7-8 hours of sleep each night.  · If recommended by your health care provider, drink red wine in moderation. This means 1 glass a day for nonpregnant women and 2 glasses a day for men. A glass of wine equals 5 oz (150 mL).  Reading food labels    · Check the serving size of packaged foods. For foods such as rice and pasta, the serving size refers to the amount of cooked product, not dry.  · Check the total fat in packaged foods. Avoid foods that have saturated fat or trans fats.  · Check the ingredients list for added sugars, such as corn syrup.  Shopping  · At the grocery store, buy most of your food from the areas near the walls of the store. This includes:  ? Fresh fruits and vegetables (produce).  ? Grains, beans, nuts, and seeds. Some of these may be available in unpackaged forms or large amounts (in bulk).  ? Fresh seafood.  ? Poultry and eggs.  ? Low-fat dairy products.  · Buy whole ingredients instead of prepackaged foods.  · Buy fresh fruits and vegetables in-season from local farmers markets.  · Buy frozen fruits and vegetables in resealable bags.  · If you do not have access to quality fresh seafood, buy precooked frozen shrimp or canned fish, such as tuna, salmon, or sardines.  · Buy small amounts of  raw or cooked vegetables, salads, or olives from the deli or salad bar at your store.  · Stock your pantry so you always have certain foods on hand, such as olive oil, canned tuna, canned tomatoes, rice, pasta, and beans.  Cooking  · Cook foods with extra-virgin olive oil instead of using butter or other vegetable oils.  · Have meat as a side dish, and have vegetables or grains as your main dish. This means having meat in small portions or adding small amounts of meat to foods like pasta or stew.  · Use beans or vegetables instead of meat in common dishes like chili or lasagna.  · [place] with different cooking methods. Try roasting or broiling vegetables instead of steaming or sautéeing them.  · Add frozen vegetables to soups, stews, pasta, or rice.  · Add nuts or seeds for added healthy fat at each meal. You can add these to yogurt, salads, or vegetable dishes.  · Marinate fish or vegetables using olive oil, lemon juice, garlic, and fresh herbs.  Meal planning    · Plan to eat 1 vegetarian meal one day each week. Try to work up to 2 vegetarian meals, if possible.  · Eat seafood 2 or more times a week.  · Have healthy snacks readily available, such as:  ? Vegetable sticks with hummus.  ? Greek yogurt.  ? Fruit and nut trail mix.  · Eat balanced meals throughout the week. This includes:  ? Fruit: 2-3 servings a day  ? Vegetables: 4-5 servings a day  ? Low-fat dairy: 2 servings a day  ? Fish, poultry, or lean meat: 1 serving a day  ? Beans and legumes: 2 or more servings a week  ? Nuts and seeds: 1-2 servings a day  ? Whole grains: 6-8 servings a day  ? Extra-virgin olive oil: 3-4 servings a day  · Limit red meat and sweets to only a few servings a month  What are my food choices?  · Mediterranean diet  ? Recommended  § Grains: Whole-grain pasta. Brown rice. Bulgar wheat. Polenta. Couscous. Whole-wheat bread. Oatmeal. Quinoa.  § Vegetables: Artichokes. Beets. Broccoli. Cabbage. Carrots. Eggplant. Green beans.  Chard. Kale. Spinach. Onions. Leeks. Peas. Squash. Tomatoes. Peppers. Radishes.  § Fruits: Apples. Apricots. Avocado. Berries. Bananas. Cherries. Dates. Figs. Grapes. Jolly. Melon. Oranges. Peaches. Plums. Pomegranate.  § Meats and other protein foods: Beans. Almonds. Sunflower seeds. Pine nuts. Peanuts. Cod. [place]. Scallops. Shrimp. Tuna. Tilapia. Clams. Oysters. Eggs.  § Dairy: Low-fat milk. Cheese. Greek yogurt.  § Beverages: Water. Red wine. Herbal tea.  § Fats and oils: Extra virgin olive oil. Avocado oil. Grape seed oil.  § Sweets and desserts: Greek yogurt with honey. Baked apples. Poached pears. Trail mix.  § Seasoning and other foods: Basil. Cilantro. Coriander. Cumin. Mint. Parsley. [place]. Rosemary. Tarragon. Garlic. Oregano. Thyme. Pepper. Balsalmic vinegar. Tahini. Hummus. Tomato sauce. Olives. Mushrooms.  ? Limit these  § Grains: Prepackaged pasta or rice dishes. Prepackaged cereal with added sugar.  § Vegetables: Deep fried potatoes (french fries).  § Fruits: Fruit canned in syrup.  § Meats and other protein foods: Beef. Pork. Lamb. Poultry with skin. Hot dogs. [place].  § Dairy: Ice cream. Sour cream. Whole milk.  § Beverages: Juice. Sugar-sweetened soft drinks. Beer. Liquor and spirits.  § Fats and oils: Butter. Canola oil. Vegetable oil. Beef fat (tallow). Lard.  § Sweets and desserts: Cookies. Cakes. Pies. Candy.  § Seasoning and other foods: Mayonnaise. Premade sauces and marinades.  The items listed may not be a complete list. Talk with your dietitian about what dietary choices are right for you.  Summary  · The Mediterranean diet includes both food and lifestyle choices.  · Eat a variety of fresh fruits and vegetables, beans, nuts, seeds, and whole grains.  · Limit the amount of red meat and sweets that you eat.  · Talk with your health care provider about whether it is safe for you to drink red wine in moderation. This means 1 glass a day for nonpregnant women and 2 glasses a day for men. A  glass of wine equals 5 oz (150 mL).  This information is not intended to replace advice given to you by your health care provider. Make sure you discuss any questions you have with your health care provider.  Document Released: 08/10/2017 Document Revised: 08/17/2017 Document Reviewed: 08/10/2017  Elsevier Patient Education © 2020 [employer]

## 2021-04-12 NOTE — PROGRESS NOTES
Subjective:     CC:   Chief Complaint   Patient presents with   • Lab Results       HPI:   Gerald presents today for follow up    Heel spur  Noted B/L calcaneal heel spurs on xray imaging. Pt had a brace that she wears on both feet at work and has changed her shoes. She has specialty inserts. She admits to burning and numbness in her toes R>L. She has most of her pain in the left foot though.    Hypertriglyceridemia  Newly diagnosed. Taking OTC fish oil now.      Past Medical History:   Diagnosis Date   • Difficulty concentrating 3/15/2019   • Head ache    • Impaired reading comprehension 2018   • Learning disability 2018   • Left ankle pain 2020   • Left-sided chest wall pain 2020   • Obesity    • Plantar fasciitis    • Ulcerative colitis (HCC)      Social History     Tobacco Use   • Smoking status: Former Smoker     Packs/day: 1.00     Years: 10.00     Pack years: 10.00     Types: Cigarettes     Quit date: 2000     Years since quittin.1   • Smokeless tobacco: Never Used   Substance Use Topics   • Alcohol use: Yes     Alcohol/week: 3.6 oz     Types: 6 Cans of beer per week     Comment: occasionally    • Drug use: No     Current Outpatient Medications Ordered in Epic   Medication Sig Dispense Refill   • Omega-3 Fatty Acids (FISH OIL) 1000 MG Cap capsule Take 1,000 mg by mouth 3 times a day with meals.     • VITAMIN D PO Take  by mouth.     • Multiple Vitamin (MULTIVITAMIN PO) Take  by mouth.     • Acetaminophen (TYLENOL PO) Take  by mouth.     • IBUPROFEN PO Take  by mouth.     • loratadine (CLARITIN) 10 MG Tab Take 1 Tab by mouth every day. 30 Tab      No current Epic-ordered facility-administered medications on file.     Morphine and Flagyl [kdc:metronidazole+tartrazine]    Health Maintenance: Completed    ROS:  Gen: no fevers/chills, no changes in weight  Eyes: no changes in vision  ENT: no sore throat, no hearing loss, no bloody nose  Pulm: no sob, no cough  CV: no chest pain, no  "palpitations, no edema  GI: no nausea/vomiting, no diarrhea  : no dysuria  Skin: no rash, no lesions  Neuro: no headaches, no numbness/tingling  Heme/Lymph: no easy bruising or bleeding    Objective:   Exam:  /62 (BP Location: Right arm, Patient Position: Sitting, BP Cuff Size: Adult)   Pulse 67   Temp 36.7 °C (98 °F) (Temporal)   Resp 14   Ht 1.702 m (5' 7\")   Wt 98 kg (216 lb)   SpO2 98%   BMI 33.83 kg/m²    Body mass index is 33.83 kg/m².    Gen: Alert and oriented, No apparent distress.   Lungs: Normal effort, CTA bilaterally, no wheezes, rhonchi, or rales  CV: Regular rate and rhythm. No murmurs, rubs, or gallops.  ABD: +BS. Non-tender, non-distended. No rebound, rigidity, or guarding.  Ext: No clubbing, cyanosis, edema.    Assessment & Plan:     45 y.o. female with the following -     1. Pain in both feet  Likely related to plantar fasciitis.  X-rays reviewed personally with patient.  Continue stretching and icing.  Trial capsaicin cream 2-3 times a day.  Wash hands after use.  Await podiatry evaluation.    2. Calcaneal spur, unspecified laterality  See above    3. Vitamin D deficiency  Continue vitamin D 2000 IU daily    4. Hypertriglyceridemia  Continue over-the-counter fish oil.  Recheck in 6 months.    Return for Pap smear.    Madonna Wilkes PA-C ([place])  Physician Assistant Certified  [employer]    Please note that this dictation was created using voice recognition software. I have made every reasonable attempt to correct obvious errors, but I expect that there are errors of grammar and possibly content that I did not discover before finalizing the note.  "

## 2021-05-01 ENCOUNTER — HOSPITAL ENCOUNTER (OUTPATIENT)
Dept: RADIOLOGY | Facility: MEDICAL CENTER | Age: 46
End: 2021-05-01
Attending: PHYSICIAN ASSISTANT
Payer: COMMERCIAL

## 2021-05-01 DIAGNOSIS — Z12.31 ENCOUNTER FOR SCREENING MAMMOGRAM FOR BREAST CANCER: ICD-10-CM

## 2021-05-01 PROCEDURE — 77063 BREAST TOMOSYNTHESIS BI: CPT

## 2021-05-10 ENCOUNTER — OFFICE VISIT (OUTPATIENT)
Dept: MEDICAL GROUP | Facility: IMAGING CENTER | Age: 46
End: 2021-05-10
Payer: COMMERCIAL

## 2021-05-10 ENCOUNTER — HOSPITAL ENCOUNTER (OUTPATIENT)
Facility: MEDICAL CENTER | Age: 46
End: 2021-05-10
Attending: PHYSICIAN ASSISTANT
Payer: COMMERCIAL

## 2021-05-10 VITALS
HEART RATE: 95 BPM | TEMPERATURE: 98.9 F | RESPIRATION RATE: 16 BRPM | OXYGEN SATURATION: 97 % | SYSTOLIC BLOOD PRESSURE: 114 MMHG | DIASTOLIC BLOOD PRESSURE: 64 MMHG | BODY MASS INDEX: 33.43 KG/M2 | HEIGHT: 67 IN | WEIGHT: 213 LBS

## 2021-05-10 DIAGNOSIS — Z11.51 SCREENING FOR HPV (HUMAN PAPILLOMAVIRUS): ICD-10-CM

## 2021-05-10 DIAGNOSIS — Z12.4 SCREENING FOR CERVICAL CANCER: ICD-10-CM

## 2021-05-10 DIAGNOSIS — Z01.419 ENCOUNTER FOR GYNECOLOGICAL EXAMINATION: ICD-10-CM

## 2021-05-10 PROCEDURE — 87624 HPV HI-RISK TYP POOLED RSLT: CPT

## 2021-05-10 PROCEDURE — 99396 PREV VISIT EST AGE 40-64: CPT | Performed by: PHYSICIAN ASSISTANT

## 2021-05-10 PROCEDURE — 88175 CYTOPATH C/V AUTO FLUID REDO: CPT

## 2021-05-10 ASSESSMENT — FIBROSIS 4 INDEX: FIB4 SCORE: 1.1

## 2021-05-10 ASSESSMENT — PAIN SCALES - GENERAL: PAINLEVEL: 4=SLIGHT-MODERATE PAIN

## 2021-05-11 LAB
CYTOLOGY REG CYTOL: NORMAL
HPV HR 12 DNA CVX QL NAA+PROBE: NEGATIVE
HPV16 DNA SPEC QL NAA+PROBE: NEGATIVE
HPV18 DNA SPEC QL NAA+PROBE: NEGATIVE
SPECIMEN SOURCE: NORMAL

## 2021-05-11 NOTE — PATIENT INSTRUCTIONS
*Message me 1 week prior to your next appointment and I will place an order for your fasting cholesterol panel so that we can review that results at your appointment.*

## 2021-05-11 NOTE — PROGRESS NOTES
Subjective:     CC:   Chief Complaint   Patient presents with   • Gynecologic Exam       HPI:   Gerald Stein is a 45 y.o. patient who presents for annual gynecological exam. She is feeling well and denies any complaints.    Ob-Gyn/ History:    Patient has GYN provider:   /Para: 5/4  Last Pap Smear:  2018   H/O of abnormal pap smears: no  Current Contraceptive Method:  none.   Sexually active: no  No LMP recorded. (Menstrual status: Irregular Menses).   Mammo: 22        has a past medical history of Difficulty concentrating (3/15/2019), Head ache, Impaired reading comprehension (2018), Learning disability (2018), Left ankle pain (2020), Left-sided chest wall pain (2020), Obesity, Plantar fasciitis, and Ulcerative colitis (HCC).     has a past surgical history that includes tubal coagulation laparoscopic bilateral and tube & ectopic preg., removal.    Family History   Problem Relation Age of Onset   • Heart Disease Father    • Cancer Father 65        brain   • Cancer Sister         eye lid, skin   • Diabetes Neg Hx    • Stroke Neg Hx        Social History     Socioeconomic History   • Marital status: Single     Spouse name: Not on file   • Number of children: Not on file   • Years of education: Not on file   • Highest education level: Not on file   Occupational History   • Not on file   Tobacco Use   • Smoking status: Former Smoker     Packs/day: 1.00     Years: 10.00     Pack years: 10.00     Types: Cigarettes     Quit date: 2000     Years since quittin.2   • Smokeless tobacco: Never Used   Substance and Sexual Activity   • Alcohol use: Yes     Alcohol/week: 3.6 oz     Types: 6 Cans of beer per week     Comment: occasionally    • Drug use: No   • Sexual activity: Yes     Partners: Male     Birth control/protection: Surgical   Other Topics Concern   • Not on file   Social History Narrative   • Not on file     Social Determinants of Health     Financial Resource  Strain:    • Difficulty of Paying Living Expenses:    Food Insecurity:    • Worried About Running Out of Food in the Last Year:    • Ran Out of Food in the Last Year:    Transportation Needs:    • Lack of Transportation (Medical):    • Lack of Transportation (Non-Medical):    Physical Activity:    • Days of Exercise per Week:    • Minutes of Exercise per Session:    Stress:    • Feeling of Stress :    Social Connections:    • Frequency of Communication with Friends and Family:    • Frequency of Social Gatherings with Friends and Family:    • Attends [employer] Services:    • Active Member of Clubs or Organizations:    • Attends Club or Organization Meetings:    • Marital Status:    Intimate Partner Violence:    • Fear of Current or Ex-Partner:    • Emotionally Abused:    • Physically Abused:    • Sexually Abused:        Patient Active Problem List    Diagnosis Date Noted   • Heel spur 04/12/2021   • Vitamin D deficiency 04/12/2021   • Hypertriglyceridemia 04/12/2021   • B12 deficiency 03/29/2021   • Neuropathy 03/29/2021   • Seasonal allergies 07/16/2019   • Pain in both feet 01/02/2019   • Hives 09/13/2018   • Ulcerative colitis (HCC) 03/13/2018   • Migraine without aura and without status migrainosus, not intractable 02/07/2018   • Obesity (BMI 30-39.9) 02/07/2018   • Fatigue 02/07/2018         Current Outpatient Medications   Medication Sig Dispense Refill   • Omega-3 Fatty Acids (FISH OIL) 1000 MG Cap capsule Take 1,000 mg by mouth 3 times a day with meals.     • VITAMIN D PO Take  by mouth.     • Multiple Vitamin (MULTIVITAMIN PO) Take  by mouth.     • Acetaminophen (TYLENOL PO) Take  by mouth.     • IBUPROFEN PO Take  by mouth.     • loratadine (CLARITIN) 10 MG Tab Take 1 Tab by mouth every day. 30 Tab      No current facility-administered medications for this visit.     Allergies   Allergen Reactions   • Morphine Vomiting   • Flagyl [Kdc:Metronidazole+Tartrazine] Rash     Rash         Review of Systems  "  Constitutional: Negative for fever, chills and malaise/fatigue.    Respiratory: Negative for cough and shortness of breath.  Cardiovascular: Negative for chest pain, palpitations, or leg swelling.   Gastrointestinal: Negative for nausea, vomiting, abdominal pain and diarrhea.   Genitourinary: Negative for dysuria and hematuria.   Skin: Negative for rash.    Psychiatric/Behavioral: Negative for depression.        Objective:     /64 (BP Location: Left arm, Patient Position: Sitting, BP Cuff Size: Adult)   Pulse 95   Temp 37.2 °C (98.9 °F) (Temporal)   Resp 16   Ht 1.702 m (5' 7\")   Wt 96.6 kg (213 lb)   SpO2 97%   BMI 33.36 kg/m²   Body mass index is 33.36 kg/m².  Wt Readings from Last 4 Encounters:   05/10/21 96.6 kg (213 lb)   04/12/21 98 kg (216 lb)   03/29/21 98.2 kg (216 lb 9.6 oz)   01/09/20 101 kg (222 lb)       Physical Exam:  Constitutional: Well-developed and well-nourished. Not diaphoretic. No distress.   Skin: Skin is warm and dry. No rash noted.  Head: Atraumatic without lesions.  Neck: Supple, trachea midline. Normal range of motion. No thyromegaly present. No lymphadenopathy--cervical or supraclavicular.  Cardiovascular: Regular rate and rhythm, S1 and S2 without murmur, rubs, or gallops.  Lungs: Normal inspiratory effort, CTA bilaterally, no wheezes/rhonchi/rales  Abdomen: Soft, non tender, and without distention. Active bowel sounds in all four quadrants. No rebound, guarding, masses or HSM.    :Perineum and external genitalia normal without rash.   Vagina with normal and physiologic discharge.   Cervix without visible lesions or discharge.   Bimanual exam without adnexal masses or cervical motion tenderness.    Extremities: No cyanosis, clubbing, erythema, nor edema. Distal pulses intact and symmetric.     A chaperone was offered to the patient during today's exam. Chaperone name: Aleena CAMILO was present.    Assessment and Plan:     1. Screening for cervical cancer  - Thinprep Pap with " HPV; Future    2. Encounter for gynecological examination  - Thinprep Pap with HPV; Future    3. Screening for HPV (human papillomavirus)  - Thinprep Pap with HPV; Future      Follow-up: Return in about 6 months (around 11/10/2021) for Cholesterol recheck - draw labs before visit.    Madonna Wilkes PA-C ([facility])  Physician Assistant Certified  [facility]

## 2021-05-13 DIAGNOSIS — N76.0 BACTERIAL VAGINOSIS: ICD-10-CM

## 2021-05-13 DIAGNOSIS — B96.89 BACTERIAL VAGINOSIS: ICD-10-CM

## 2021-05-13 RX ORDER — CLINDAMYCIN PHOSPHATE 20 MG/G
1 CREAM VAGINAL
Qty: 40 G | Refills: 0 | Status: SHIPPED | OUTPATIENT
Start: 2021-05-13 | End: 2021-05-16

## 2021-07-06 ENCOUNTER — OFFICE VISIT (OUTPATIENT)
Dept: MEDICAL GROUP | Facility: IMAGING CENTER | Age: 46
End: 2021-07-06
Payer: COMMERCIAL

## 2021-07-06 ENCOUNTER — HOSPITAL ENCOUNTER (OUTPATIENT)
Facility: MEDICAL CENTER | Age: 46
End: 2021-07-06
Attending: PHYSICIAN ASSISTANT
Payer: COMMERCIAL

## 2021-07-06 VITALS
WEIGHT: 218.8 LBS | DIASTOLIC BLOOD PRESSURE: 74 MMHG | OXYGEN SATURATION: 99 % | TEMPERATURE: 99 F | HEIGHT: 67 IN | HEART RATE: 83 BPM | BODY MASS INDEX: 34.34 KG/M2 | SYSTOLIC BLOOD PRESSURE: 104 MMHG

## 2021-07-06 DIAGNOSIS — R30.0 DYSURIA: ICD-10-CM

## 2021-07-06 DIAGNOSIS — N89.8 VAGINAL ITCHING: ICD-10-CM

## 2021-07-06 LAB
APPEARANCE UR: NORMAL
BILIRUB UR STRIP-MCNC: NEGATIVE MG/DL
COLOR UR AUTO: NORMAL
GLUCOSE UR STRIP.AUTO-MCNC: 100 MG/DL
KETONES UR STRIP.AUTO-MCNC: NEGATIVE MG/DL
LEUKOCYTE ESTERASE UR QL STRIP.AUTO: NORMAL
NITRITE UR QL STRIP.AUTO: POSITIVE
PH UR STRIP.AUTO: 5 [PH] (ref 5–8)
PROT UR QL STRIP: 30 MG/DL
RBC UR QL AUTO: NORMAL
SP GR UR STRIP.AUTO: 1.01
UROBILINOGEN UR STRIP-MCNC: 1 MG/DL

## 2021-07-06 PROCEDURE — 81002 URINALYSIS NONAUTO W/O SCOPE: CPT | Performed by: PHYSICIAN ASSISTANT

## 2021-07-06 PROCEDURE — 99214 OFFICE O/P EST MOD 30 MIN: CPT | Performed by: PHYSICIAN ASSISTANT

## 2021-07-06 PROCEDURE — 87186 SC STD MICRODIL/AGAR DIL: CPT

## 2021-07-06 PROCEDURE — 87086 URINE CULTURE/COLONY COUNT: CPT

## 2021-07-06 PROCEDURE — 87077 CULTURE AEROBIC IDENTIFY: CPT

## 2021-07-06 RX ORDER — CEPHALEXIN 500 MG/1
500 CAPSULE ORAL 2 TIMES DAILY
Qty: 10 CAPSULE | Refills: 0 | Status: SHIPPED | OUTPATIENT
Start: 2021-07-06 | End: 2021-07-11

## 2021-07-06 RX ORDER — FLUCONAZOLE 150 MG/1
150 TABLET ORAL ONCE
Qty: 1 TABLET | Refills: 1 | Status: SHIPPED | OUTPATIENT
Start: 2021-07-06 | End: 2021-07-06

## 2021-07-06 ASSESSMENT — FIBROSIS 4 INDEX: FIB4 SCORE: 1.1

## 2021-07-06 NOTE — PROGRESS NOTES
"Subjective:     CC:   Chief Complaint   Patient presents with   • Dysuria     past yeast infection from recent antibiotic use        HPI:   Gerald presents today with     Dysuria  Pt admits to burning with urination and urgency x 2-3 days. Pt admits to vaginal itching and \"raw\" sensation since being on the Clindamycin gel. No discharge or blood in urine. Has been taking Azo with mild relief. Has been increasing her fluids.       Past Medical History:   Diagnosis Date   • Difficulty concentrating 3/15/2019   • Head ache    • Impaired reading comprehension 2018   • Learning disability 2018   • Left ankle pain 2020   • Left-sided chest wall pain 2020   • Obesity    • Plantar fasciitis    • Ulcerative colitis (HCC)      Social History     Tobacco Use   • Smoking status: Former Smoker     Packs/day: 1.00     Years: 10.00     Pack years: 10.00     Types: Cigarettes     Quit date: 2000     Years since quittin.4   • Smokeless tobacco: Never Used   Vaping Use   • Vaping Use: Never used   Substance Use Topics   • Alcohol use: Yes     Alcohol/week: 3.6 oz     Types: 6 Cans of beer per week     Comment: occasionally    • Drug use: No     Current Outpatient Medications Ordered in Epic   Medication Sig Dispense Refill   • Phenazopyridine HCl (AZO URINARY PAIN PO) Take  by mouth.     • cephALEXin (KEFLEX) 500 MG Cap Take 1 capsule by mouth 2 times a day for 5 days. 10 capsule 0   • fluconazole (DIFLUCAN) 150 MG tablet Take 1 tablet by mouth one time for 1 dose. 1 tablet 1   • Omega-3 Fatty Acids (FISH OIL) 1000 MG Cap capsule Take 1,000 mg by mouth 3 times a day with meals.     • VITAMIN D PO Take  by mouth.     • Multiple Vitamin (MULTIVITAMIN PO) Take  by mouth.     • Acetaminophen (TYLENOL PO) Take  by mouth.     • IBUPROFEN PO Take  by mouth.     • loratadine (CLARITIN) 10 MG Tab Take 1 Tab by mouth every day. 30 Tab      No current Epic-ordered facility-administered medications on file. " "    Morphine and Flagyl [kdc:metronidazole+tartrazine]    Health Maintenance: Completed    ROS: See hpi  Gen: no fevers/chills, no changes in weight  Pulm: no sob, no cough  CV: no chest pain, no palpitations, no edema  GI: no nausea/vomiting, no diarrhea  Skin: no rash, no lesions    Objective:   Exam:  /74 (BP Location: Left arm, Patient Position: Sitting, BP Cuff Size: Adult)   Pulse 83   Temp 37.2 °C (99 °F) (Temporal)   Ht 1.702 m (5' 7\")   Wt 99.2 kg (218 lb 12.8 oz)   SpO2 99%   BMI 34.27 kg/m²    Body mass index is 34.27 kg/m².    Gen: Alert and oriented, No apparent distress.  HEENT: Head atraumatic, normocephalic. Pupils equal and round.  Lungs: Normal effort, CTA bilaterally, no wheezes, rhonchi, or rales  CV: Regular rate and rhythm. No murmurs, rubs, or gallops.  ABD: +BS. Non-tender, non-distended. No rebound, rigidity, or guarding.  Ext: No clubbing, cyanosis, edema.    Assessment & Plan:     45 y.o. female with the following -     1. Dysuria  Suspect UTI.  Urinalysis inaccurate due to recent Azo use.  Will send for urine culture.  Increase fluids.  - POCT Urinalysis  - cephALEXin (KEFLEX) 500 MG Cap; Take 1 capsule by mouth 2 times a day for 5 days.  Dispense: 10 capsule; Refill: 0  - fluconazole (DIFLUCAN) 150 MG tablet; Take 1 tablet by mouth one time for 1 dose.  Dispense: 1 tablet; Refill: 1  - URINE CULTURE(NEW); Future    2. Vaginal itching  Likely related to recent clindamycin topical gel.  Patient's current symptoms consistent with possible vaginal candidiasis.  Will start on Diflucan.    Return if symptoms worsen or fail to improve.    Madonna Wilkes PA-C ([facility])  Physician Assistant Certified  [facility]    Please note that this dictation was created using voice recognition software. I have made every reasonable attempt to correct obvious errors, but I expect that there are errors of grammar and possibly content that I did not discover before finalizing the note.  "

## 2021-07-06 NOTE — ASSESSMENT & PLAN NOTE
"Pt admits to burning with urination and urgency x 2-3 days. Pt admits to vaginal itching and \"raw\" sensation since being on the Clindamycin gel. No discharge or blood in urine. Has been taking Azo with mild relief. Has been increasing her fluids.   "

## 2021-07-08 LAB
BACTERIA UR CULT: ABNORMAL
BACTERIA UR CULT: ABNORMAL
SIGNIFICANT IND 70042: ABNORMAL
SITE SITE: ABNORMAL
SOURCE SOURCE: ABNORMAL

## 2021-07-19 ENCOUNTER — HOSPITAL ENCOUNTER (OUTPATIENT)
Facility: MEDICAL CENTER | Age: 46
End: 2021-07-19
Attending: PHYSICIAN ASSISTANT
Payer: COMMERCIAL

## 2021-07-19 ENCOUNTER — NON-PROVIDER VISIT (OUTPATIENT)
Dept: MEDICAL GROUP | Facility: IMAGING CENTER | Age: 46
End: 2021-07-19
Payer: COMMERCIAL

## 2021-07-19 DIAGNOSIS — R35.0 URINARY FREQUENCY: ICD-10-CM

## 2021-07-19 DIAGNOSIS — R30.0 DYSURIA: ICD-10-CM

## 2021-07-19 PROCEDURE — 99999 PR NO CHARGE: CPT | Performed by: PHYSICIAN ASSISTANT

## 2021-07-19 PROCEDURE — 87086 URINE CULTURE/COLONY COUNT: CPT

## 2021-07-19 RX ORDER — NITROFURANTOIN 25; 75 MG/1; MG/1
100 CAPSULE ORAL 2 TIMES DAILY
Qty: 10 CAPSULE | Refills: 0 | Status: SHIPPED | OUTPATIENT
Start: 2021-07-19 | End: 2021-07-24

## 2021-07-19 NOTE — PROGRESS NOTES
Gerald Stein is a 45 y.o. female here for a non-provider visit for urine sample    If abnormal was an in office provider notified today (if so, indicate provider)? No    Routed to PCP? Yes    Patient here today with urinary burning and frequency. Patient states she was seen 2 weeks ago for a UTI. Was given 5 day antibiotic course that she finished. She states she went about a week without any symptoms with her symptoms returning. Patient states she also has some burning and feels sensitive near the vaginal opening. Patient states she took AZO this morning around 6 am. Per Madonna, we will send in urine culture and prescribe antibiotics.

## 2021-07-20 DIAGNOSIS — R35.0 URINARY FREQUENCY: ICD-10-CM

## 2021-07-23 LAB
BACTERIA UR CULT: NORMAL
SIGNIFICANT IND 70042: NORMAL
SITE SITE: NORMAL
SOURCE SOURCE: NORMAL

## 2021-07-27 ENCOUNTER — OFFICE VISIT (OUTPATIENT)
Dept: MEDICAL GROUP | Facility: IMAGING CENTER | Age: 46
End: 2021-07-27
Payer: COMMERCIAL

## 2021-07-27 ENCOUNTER — HOSPITAL ENCOUNTER (OUTPATIENT)
Facility: MEDICAL CENTER | Age: 46
End: 2021-07-27
Attending: PHYSICIAN ASSISTANT
Payer: COMMERCIAL

## 2021-07-27 VITALS
BODY MASS INDEX: 35.16 KG/M2 | HEIGHT: 67 IN | OXYGEN SATURATION: 98 % | HEART RATE: 79 BPM | DIASTOLIC BLOOD PRESSURE: 64 MMHG | WEIGHT: 224 LBS | SYSTOLIC BLOOD PRESSURE: 106 MMHG | TEMPERATURE: 98.1 F

## 2021-07-27 DIAGNOSIS — N95.2 VAGINAL ATROPHY: ICD-10-CM

## 2021-07-27 DIAGNOSIS — R30.0 DYSURIA: ICD-10-CM

## 2021-07-27 LAB
APPEARANCE UR: CLEAR
BILIRUB UR STRIP-MCNC: NEGATIVE MG/DL
COLOR UR AUTO: YELLOW
GLUCOSE UR STRIP.AUTO-MCNC: NEGATIVE MG/DL
KETONES UR STRIP.AUTO-MCNC: NEGATIVE MG/DL
LEUKOCYTE ESTERASE UR QL STRIP.AUTO: NEGATIVE
NITRITE UR QL STRIP.AUTO: NEGATIVE
PH UR STRIP.AUTO: 6 [PH] (ref 5–8)
PROT UR QL STRIP: NEGATIVE MG/DL
RBC UR QL AUTO: NORMAL
SP GR UR STRIP.AUTO: 1.02
UROBILINOGEN UR STRIP-MCNC: 0.2 MG/DL

## 2021-07-27 PROCEDURE — 81002 URINALYSIS NONAUTO W/O SCOPE: CPT | Performed by: PHYSICIAN ASSISTANT

## 2021-07-27 PROCEDURE — 87086 URINE CULTURE/COLONY COUNT: CPT

## 2021-07-27 PROCEDURE — 99214 OFFICE O/P EST MOD 30 MIN: CPT | Performed by: PHYSICIAN ASSISTANT

## 2021-07-27 RX ORDER — GABAPENTIN 100 MG/1
100 CAPSULE ORAL
COMMUNITY
Start: 2021-07-12

## 2021-07-27 RX ORDER — ESTRADIOL 0.1 MG/G
CREAM VAGINAL
Qty: 42.5 G | Refills: 0 | Status: SHIPPED | OUTPATIENT
Start: 2021-07-27

## 2021-07-27 ASSESSMENT — FIBROSIS 4 INDEX: FIB4 SCORE: 1.12

## 2021-07-27 NOTE — ASSESSMENT & PLAN NOTE
Patient with persistent sensation of discomfort with urination.  She states that she has noticed increased vaginal and urethral dryness over the past few months.  She states that her menstrual cycles are very irregular over the past year.  She denies any urinary frequency or urgency.  No back pain or flank pain.  No fever or chills.  No vaginal discharge or itching.  No hematuria.

## 2021-07-27 NOTE — PROGRESS NOTES
Subjective:     CC:   Chief Complaint   Patient presents with   • Dysuria       HPI:   Gerald presents today c/o    Dysuria  Patient with persistent sensation of discomfort with urination.  She states that she has noticed increased vaginal and urethral dryness over the past few months.  She states that her menstrual cycles are very irregular over the past year.  She denies any urinary frequency or urgency.  No back pain or flank pain.  No fever or chills.  No vaginal discharge or itching.  No hematuria.      Past Medical History:   Diagnosis Date   • Difficulty concentrating 3/15/2019   • Head ache    • Impaired reading comprehension 2018   • Learning disability 2018   • Left ankle pain 2020   • Left-sided chest wall pain 2020   • Obesity    • Plantar fasciitis    • Ulcerative colitis (HCC)      Social History     Tobacco Use   • Smoking status: Former Smoker     Packs/day: 1.00     Years: 10.00     Pack years: 10.00     Types: Cigarettes     Quit date: 2000     Years since quittin.4   • Smokeless tobacco: Never Used   Vaping Use   • Vaping Use: Never used   Substance Use Topics   • Alcohol use: Yes     Alcohol/week: 3.6 oz     Types: 6 Cans of beer per week     Comment: occasionally    • Drug use: No     Current Outpatient Medications Ordered in Epic   Medication Sig Dispense Refill   • gabapentin (NEURONTIN) 100 MG Cap Take 100 mg by mouth every day.     • estradiol (ESTRACE) 0.1 MG/GM vaginal cream Apply small amount to urethra and vagina daily 42.5 g 0   • Omega-3 Fatty Acids (FISH OIL) 1000 MG Cap capsule Take 1,000 mg by mouth 3 times a day with meals.     • VITAMIN D PO Take  by mouth.     • Multiple Vitamin (MULTIVITAMIN PO) Take  by mouth.     • Acetaminophen (TYLENOL PO) Take  by mouth.     • IBUPROFEN PO Take  by mouth.     • loratadine (CLARITIN) 10 MG Tab Take 1 Tab by mouth every day. 30 Tab      No current Epic-ordered facility-administered medications on file.     Morphine  "and Flagyl [kdc:metronidazole+tartrazine]    Health Maintenance: Completed    ROS: See HPI  Gen: no fevers/chills, no changes in weight  Pulm: no sob, no cough  CV: no chest pain, no palpitations, no edema  GI: no nausea/vomiting, no diarrhea  Skin: no rash, no lesions  Neuro: no headaches, no numbness/tingling  Heme/Lymph: no easy bruising or bleeding    Objective:   Exam:  /64 (BP Location: Left arm, Patient Position: Sitting, BP Cuff Size: Adult)   Pulse 79   Temp 36.7 °C (98.1 °F) (Temporal)   Ht 1.702 m (5' 7\")   Wt 102 kg (224 lb)   SpO2 98%   BMI 35.08 kg/m²    Body mass index is 35.08 kg/m². No LMP recorded. (Menstrual status: Irregular Menses).    Gen: Alert and oriented, No apparent distress.  HEENT: Head atraumatic, normocephalic. Pupils equal and round.  Lungs: Normal effort, CTA bilaterally, no wheezes, rhonchi, or rales  CV: Regular rate and rhythm. No murmurs, rubs, or gallops.  ABD: +BS. Non-tender, non-distended. No rebound, rigidity, or guarding.  Ext: No clubbing, cyanosis, edema.    Assessment & Plan:     46 y.o. female with the following -     1. Dysuria  Urinalysis within normal limits.  Suspect related to vaginal atrophy.  Will start Estrace cream daily.  Will send for urine culture for completeness.  Patient to follow-up in 1-2 weeks if symptoms persist.  - POCT Urinalysis  - URINE CULTURE(NEW); Future  - estradiol (ESTRACE) 0.1 MG/GM vaginal cream; Apply small amount to urethra and vagina daily  Dispense: 42.5 g; Refill: 0    2. Vaginal atrophy  - estradiol (ESTRACE) 0.1 MG/GM vaginal cream; Apply small amount to urethra and vagina daily  Dispense: 42.5 g; Refill: 0    Return for As scheduled.    Madonna Wilkes PA-C ([place])  Physician Assistant Certified  [facility]    Please note that this dictation was created using voice recognition software. I have made every reasonable attempt to correct obvious errors, but I expect that there are errors of grammar and possibly " content that I did not discover before finalizing the note.

## 2021-07-30 LAB
BACTERIA UR CULT: NORMAL
SIGNIFICANT IND 70042: NORMAL
SITE SITE: NORMAL
SOURCE SOURCE: NORMAL

## 2021-10-12 ENCOUNTER — OFFICE VISIT (OUTPATIENT)
Dept: MEDICAL GROUP | Facility: IMAGING CENTER | Age: 46
End: 2021-10-12
Payer: COMMERCIAL

## 2021-10-12 ENCOUNTER — HOSPITAL ENCOUNTER (OUTPATIENT)
Dept: LAB | Facility: MEDICAL CENTER | Age: 46
End: 2021-10-12
Attending: PHYSICIAN ASSISTANT
Payer: COMMERCIAL

## 2021-10-12 VITALS
OXYGEN SATURATION: 97 % | HEIGHT: 67 IN | HEART RATE: 75 BPM | DIASTOLIC BLOOD PRESSURE: 74 MMHG | SYSTOLIC BLOOD PRESSURE: 100 MMHG | TEMPERATURE: 97.7 F | WEIGHT: 223 LBS | BODY MASS INDEX: 35 KG/M2

## 2021-10-12 DIAGNOSIS — N92.6 IRREGULAR MENSES: ICD-10-CM

## 2021-10-12 DIAGNOSIS — E55.9 VITAMIN D DEFICIENCY: ICD-10-CM

## 2021-10-12 DIAGNOSIS — E78.1 HYPERTRIGLYCERIDEMIA: ICD-10-CM

## 2021-10-12 PROBLEM — R30.0 DYSURIA: Status: RESOLVED | Noted: 2021-07-06 | Resolved: 2021-10-12

## 2021-10-12 LAB
25(OH)D3 SERPL-MCNC: 36 NG/ML (ref 30–100)
APPEARANCE UR: NORMAL
BASOPHILS # BLD AUTO: 1.6 % (ref 0–1.8)
BASOPHILS # BLD: 0.11 K/UL (ref 0–0.12)
BILIRUB UR STRIP-MCNC: NORMAL MG/DL
CHOLEST SERPL-MCNC: 186 MG/DL (ref 100–199)
COLOR UR AUTO: NORMAL
EOSINOPHIL # BLD AUTO: 0.26 K/UL (ref 0–0.51)
EOSINOPHIL NFR BLD: 3.7 % (ref 0–6.9)
ERYTHROCYTE [DISTWIDTH] IN BLOOD BY AUTOMATED COUNT: 39.6 FL (ref 35.9–50)
FASTING STATUS PATIENT QL REPORTED: NORMAL
FERRITIN SERPL-MCNC: 159 NG/ML (ref 10–291)
FSH SERPL-ACNC: 5.4 MIU/ML
GLUCOSE UR STRIP.AUTO-MCNC: NEGATIVE MG/DL
HCT VFR BLD AUTO: 44.9 % (ref 37–47)
HDLC SERPL-MCNC: 34 MG/DL
HGB BLD-MCNC: 16.2 G/DL (ref 12–16)
IMM GRANULOCYTES # BLD AUTO: 0.03 K/UL (ref 0–0.11)
IMM GRANULOCYTES NFR BLD AUTO: 0.4 % (ref 0–0.9)
INT CON NEG: NORMAL
INT CON POS: NORMAL
KETONES UR STRIP.AUTO-MCNC: NORMAL MG/DL
LDLC SERPL CALC-MCNC: 100 MG/DL
LEUKOCYTE ESTERASE UR QL STRIP.AUTO: NEGATIVE
LH SERPL-ACNC: 4.6 IU/L
LYMPHOCYTES # BLD AUTO: 3.09 K/UL (ref 1–4.8)
LYMPHOCYTES NFR BLD: 44.3 % (ref 22–41)
MCH RBC QN AUTO: 32.7 PG (ref 27–33)
MCHC RBC AUTO-ENTMCNC: 36.1 G/DL (ref 33.6–35)
MCV RBC AUTO: 90.5 FL (ref 81.4–97.8)
MONOCYTES # BLD AUTO: 0.66 K/UL (ref 0–0.85)
MONOCYTES NFR BLD AUTO: 9.5 % (ref 0–13.4)
NEUTROPHILS # BLD AUTO: 2.83 K/UL (ref 2–7.15)
NEUTROPHILS NFR BLD: 40.5 % (ref 44–72)
NITRITE UR QL STRIP.AUTO: NEGATIVE
NRBC # BLD AUTO: 0 K/UL
NRBC BLD-RTO: 0 /100 WBC
PH UR STRIP.AUTO: 5 [PH] (ref 5–8)
PLATELET # BLD AUTO: 208 K/UL (ref 164–446)
PMV BLD AUTO: 11 FL (ref 9–12.9)
POC URINE PREGNANCY TEST: NEGATIVE
PROGEST SERPL-MCNC: 0.18 NG/ML
PROLACTIN SERPL-MCNC: 6.37 NG/ML (ref 2.8–26)
PROT UR QL STRIP: NEGATIVE MG/DL
RBC # BLD AUTO: 4.96 M/UL (ref 4.2–5.4)
RBC UR QL AUTO: NORMAL
SP GR UR STRIP.AUTO: 1.02
TRIGL SERPL-MCNC: 260 MG/DL (ref 0–149)
TSH SERPL DL<=0.005 MIU/L-ACNC: 1.22 UIU/ML (ref 0.38–5.33)
UROBILINOGEN UR STRIP-MCNC: 0.2 MG/DL
WBC # BLD AUTO: 7 K/UL (ref 4.8–10.8)

## 2021-10-12 PROCEDURE — 84443 ASSAY THYROID STIM HORMONE: CPT

## 2021-10-12 PROCEDURE — 84146 ASSAY OF PROLACTIN: CPT

## 2021-10-12 PROCEDURE — 36415 COLL VENOUS BLD VENIPUNCTURE: CPT

## 2021-10-12 PROCEDURE — 82671 ASSAY OF ESTROGENS: CPT

## 2021-10-12 PROCEDURE — 83002 ASSAY OF GONADOTROPIN (LH): CPT

## 2021-10-12 PROCEDURE — 81002 URINALYSIS NONAUTO W/O SCOPE: CPT | Performed by: PHYSICIAN ASSISTANT

## 2021-10-12 PROCEDURE — 84402 ASSAY OF FREE TESTOSTERONE: CPT

## 2021-10-12 PROCEDURE — 84403 ASSAY OF TOTAL TESTOSTERONE: CPT

## 2021-10-12 PROCEDURE — 82626 DEHYDROEPIANDROSTERONE: CPT

## 2021-10-12 PROCEDURE — 84144 ASSAY OF PROGESTERONE: CPT

## 2021-10-12 PROCEDURE — 84270 ASSAY OF SEX HORMONE GLOBUL: CPT

## 2021-10-12 PROCEDURE — 81025 URINE PREGNANCY TEST: CPT | Performed by: PHYSICIAN ASSISTANT

## 2021-10-12 PROCEDURE — 82728 ASSAY OF FERRITIN: CPT

## 2021-10-12 PROCEDURE — 83001 ASSAY OF GONADOTROPIN (FSH): CPT

## 2021-10-12 PROCEDURE — 85025 COMPLETE CBC W/AUTO DIFF WBC: CPT

## 2021-10-12 PROCEDURE — 80061 LIPID PANEL: CPT

## 2021-10-12 PROCEDURE — 82306 VITAMIN D 25 HYDROXY: CPT

## 2021-10-12 PROCEDURE — 99213 OFFICE O/P EST LOW 20 MIN: CPT | Performed by: PHYSICIAN ASSISTANT

## 2021-10-12 ASSESSMENT — PAIN SCALES - GENERAL: PAINLEVEL: NO PAIN

## 2021-10-12 ASSESSMENT — FIBROSIS 4 INDEX: FIB4 SCORE: 1.12

## 2021-10-12 NOTE — PATIENT INSTRUCTIONS
Once resulted, your lab/test/imaging results will show up automatically in your MyChart. Please wait for my interpretation and recommendations prior to viewing your results to avoid any unnecessary confusion or misinterpretation. I will address all of the lab values that I interpret as abnormal and message you accordingly on your MyChart. I will always send you a message on your labs even if they are normal. If you do not hear back from me within 5 business days after getting your labs drawn, then please send me a message on MyChart so I can obtain your labs (especially if you went to an outside lab - [facility], [facility], etc). If you have any additional questions or concerns beyond my interpretation of your results, please make an appointment with me to discuss in further detail.    Please only use the [employer] messaging system for questions regarding your most recent appointment or if advised to use otherwise (glucose or blood pressure reporting). If you have any new problems or concerns, you must make an appointment to discuss. This includes any referral requests.     Thank you,    Madonna Wilkes PA-C ([place])  Physician Assistant Certified  [facility]

## 2021-10-12 NOTE — PROGRESS NOTES
Subjective:     CC:   Chief Complaint   Patient presents with   • Menstrual Problem       HPI:   Gerald presents today to discuss:    Irregular menses  Over the past three months, her menstrual cycles are now every other week.  Previously she would have menstrual cycles monthly and occasionally would skip a month.  Bleeds for 5 days total. Occasional cramping and pain on the right. No pain while off period.  No supplements or hormone therapy.  No longer using topical Estrace.    Hypertriglyceridemia  Currently taking fish oil capsules 3 times a day.     Ref. Range 3/29/2021 08:51   Cholesterol,Tot Latest Ref Range: 100 - 199 mg/dL 182   Triglycerides Latest Ref Range: 0 - 149 mg/dL 247 (H)   HDL Latest Ref Range: >=40 mg/dL 31 (A)   LDL Latest Ref Range: <100 mg/dL 102 (H)       Past Medical History:   Diagnosis Date   • Difficulty concentrating 3/15/2019   • Dysuria 2021   • Head ache    • Impaired reading comprehension 2018   • Learning disability 2018   • Left ankle pain 2020   • Left-sided chest wall pain 2020   • Obesity    • Plantar fasciitis    • Ulcerative colitis (HCC)      Social History     Tobacco Use   • Smoking status: Former Smoker     Packs/day: 1.00     Years: 10.00     Pack years: 10.00     Types: Cigarettes     Quit date: 2000     Years since quittin.6   • Smokeless tobacco: Never Used   Vaping Use   • Vaping Use: Never used   Substance Use Topics   • Alcohol use: Yes     Alcohol/week: 3.6 oz     Types: 6 Cans of beer per week     Comment: occasionally    • Drug use: No     Current Outpatient Medications Ordered in Epic   Medication Sig Dispense Refill   • gabapentin (NEURONTIN) 100 MG Cap Take 100 mg by mouth every day.     • estradiol (ESTRACE) 0.1 MG/GM vaginal cream Apply small amount to urethra and vagina daily 42.5 g 0   • Omega-3 Fatty Acids (FISH OIL) 1000 MG Cap capsule Take 1,000 mg by mouth 3 times a day with meals.     • VITAMIN D PO Take  by mouth.    "  • Multiple Vitamin (MULTIVITAMIN PO) Take  by mouth.     • Acetaminophen (TYLENOL PO) Take  by mouth.     • IBUPROFEN PO Take  by mouth.     • loratadine (CLARITIN) 10 MG Tab Take 1 Tab by mouth every day. 30 Tab      No current Epic-ordered facility-administered medications on file.     Morphine and Flagyl [kdc:metronidazole+tartrazine]    Health Maintenance: Completed    ROS: see hpi  Gen: no fevers/chills  Pulm: no sob, no cough  CV: no chest pain, no palpitations, no edema  GI: no nausea/vomiting, no diarrhea  Skin: no rash, no lesions  Heme/Lymph: no easy bruising or bleeding    Objective:   Exam:  /74 (BP Location: Left arm, Patient Position: Sitting, BP Cuff Size: Adult)   Pulse 75   Temp 36.5 °C (97.7 °F) (Temporal)   Ht 1.702 m (5' 7\")   Wt 101 kg (223 lb)   SpO2 97%   BMI 34.93 kg/m²    Body mass index is 34.93 kg/m².    Gen: Alert and oriented, No apparent distress.  HEENT: Head atraumatic, normocephalic. Pupils equal and round.  Neck: Neck is supple without lymphadenopathy.   Lungs: Normal effort, CTA bilaterally, no wheezes, rhonchi, or rales  CV: Regular rate and rhythm. No murmurs, rubs, or gallops.  ABD: +BS. Non-tender, non-distended. No rebound, rigidity, or guarding.  Ext: No clubbing, cyanosis, edema.    Assessment & Plan:     46 y.o. female with the following -     1. Irregular menses  Patient with irregular frequent menses.  See orders below.  Refer to gynecology for further management.  - US-PELVIC COMPLETE (TRANSABDOMINAL/TRANSVAGINAL) (COMBO); Future  - POCT Urinalysis  - POC URINE PREGNANCY  - CBC WITH DIFFERENTIAL; Future  - TSH WITH REFLEX TO FT4; Future  - REFERRAL TO GYNECOLOGY  - FSH/LH; Future  - ESTROGEN TOTAL; Future  - DHEA; Future  - TESTOSTERONE, FREE AND TOTAL; Future  - PROLACTIN; Future  - PROGESTERONE; Future  - FERRITIN; Future    2. Hypertriglyceridemia  Noted.  Labs.  Continue fish oil at this time.  Check updated lipid profile.  - Lipid Profile; Future    3. " Vitamin D deficiency  - VITAMIN D,25 HYDROXY; Future    Return for Will notify patient to follow-up pending tests.    Madonna Wilkes PA-C ([place])  Physician Assistant Certified  [facility]    Please note that this dictation was created using voice recognition software. I have made every reasonable attempt to correct obvious errors, but I expect that there are errors of grammar and possibly content that I did not discover before finalizing the note.

## 2021-10-12 NOTE — ASSESSMENT & PLAN NOTE
Over the past three months, her menstrual cycles are now every other week.  Previously she would have menstrual cycles monthly and occasionally would skip a month.  Bleeds for 5 days total. Occasional cramping and pain on the right. No pain while off period.  No supplements or hormone therapy.  No longer using topical Estrace.

## 2021-10-12 NOTE — ASSESSMENT & PLAN NOTE
Currently taking fish oil capsules 3 times a day.     Ref. Range 3/29/2021 08:51   Cholesterol,Tot Latest Ref Range: 100 - 199 mg/dL 182   Triglycerides Latest Ref Range: 0 - 149 mg/dL 247 (H)   HDL Latest Ref Range: >=40 mg/dL 31 (A)   LDL Latest Ref Range: <100 mg/dL 102 (H)

## 2021-10-15 LAB
ESTRADIOL SERPL HS-MCNC: 86.7 PG/ML
ESTRONE SERPL-MCNC: 44.7 PG/ML

## 2021-10-16 LAB
DHEA SERPL-MCNC: 3.67 NG/ML (ref 0.63–4.7)
SHBG SERPL-SCNC: 37 NMOL/L (ref 30–135)
TESTOST FREE SERPL-MCNC: 2.2 PG/ML (ref 1.1–5.8)
TESTOST SERPL-MCNC: 14 NG/DL (ref 9–55)

## 2021-10-25 ENCOUNTER — HOSPITAL ENCOUNTER (OUTPATIENT)
Dept: RADIOLOGY | Facility: MEDICAL CENTER | Age: 46
End: 2021-10-25
Attending: PHYSICIAN ASSISTANT
Payer: COMMERCIAL

## 2021-10-25 DIAGNOSIS — N92.6 IRREGULAR MENSES: ICD-10-CM

## 2021-10-25 PROCEDURE — 76830 TRANSVAGINAL US NON-OB: CPT

## 2021-11-02 ENCOUNTER — APPOINTMENT (OUTPATIENT)
Dept: RADIOLOGY | Facility: MEDICAL CENTER | Age: 46
End: 2021-11-02
Attending: EMERGENCY MEDICINE
Payer: COMMERCIAL

## 2021-11-02 ENCOUNTER — HOSPITAL ENCOUNTER (EMERGENCY)
Facility: MEDICAL CENTER | Age: 46
End: 2021-11-02
Attending: EMERGENCY MEDICINE
Payer: COMMERCIAL

## 2021-11-02 VITALS
RESPIRATION RATE: 16 BRPM | TEMPERATURE: 98.1 F | HEIGHT: 67 IN | DIASTOLIC BLOOD PRESSURE: 74 MMHG | BODY MASS INDEX: 35.78 KG/M2 | WEIGHT: 227.96 LBS | SYSTOLIC BLOOD PRESSURE: 126 MMHG | OXYGEN SATURATION: 97 % | HEART RATE: 91 BPM

## 2021-11-02 DIAGNOSIS — S40.012A CONTUSION OF LEFT SHOULDER, INITIAL ENCOUNTER: ICD-10-CM
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
## 2021-11-02 DIAGNOSIS — S20.211A CHEST WALL CONTUSION, RIGHT, INITIAL ENCOUNTER: ICD-10-CM

## 2021-11-02 PROCEDURE — 99284 EMERGENCY DEPT VISIT MOD MDM: CPT | Mod: EDC

## 2021-11-02 PROCEDURE — 700102 HCHG RX REV CODE 250 W/ 637 OVERRIDE(OP): Performed by: EMERGENCY MEDICINE

## 2021-11-02 PROCEDURE — 71101 X-RAY EXAM UNILAT RIBS/CHEST: CPT | Mod: RT

## 2021-11-02 PROCEDURE — 73030 X-RAY EXAM OF SHOULDER: CPT | Mod: LT

## 2021-11-02 PROCEDURE — A9270 NON-COVERED ITEM OR SERVICE: HCPCS | Performed by: EMERGENCY MEDICINE

## 2021-11-02 RX ORDER — IBUPROFEN 600 MG/1
600 TABLET ORAL ONCE
Status: COMPLETED | OUTPATIENT
Start: 2021-11-02 | End: 2021-11-02

## 2021-11-02 RX ORDER — ACETAMINOPHEN 325 MG/1
650 TABLET ORAL ONCE
Status: COMPLETED | OUTPATIENT
Start: 2021-11-02 | End: 2021-11-02

## 2021-11-02 RX ORDER — IBUPROFEN 600 MG/1
600 TABLET ORAL EVERY 6 HOURS PRN
Qty: 20 TABLET | Refills: 0 | Status: SHIPPED | OUTPATIENT
Start: 2021-11-02

## 2021-11-02 RX ADMIN — IBUPROFEN 600 MG: 600 TABLET ORAL at 11:47

## 2021-11-02 RX ADMIN — ACETAMINOPHEN 650 MG: 325 TABLET ORAL at 11:47

## 2021-11-02 ASSESSMENT — FIBROSIS 4 INDEX: FIB4 SCORE: 0.95
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
